# Patient Record
Sex: FEMALE | Race: WHITE | Employment: FULL TIME | URBAN - METROPOLITAN AREA
[De-identification: names, ages, dates, MRNs, and addresses within clinical notes are randomized per-mention and may not be internally consistent; named-entity substitution may affect disease eponyms.]

---

## 2021-10-15 ENCOUNTER — APPOINTMENT (OUTPATIENT)
Dept: CT IMAGING | Age: 61
End: 2021-10-15
Payer: COMMERCIAL

## 2021-10-15 ENCOUNTER — HOSPITAL ENCOUNTER (EMERGENCY)
Age: 61
Discharge: HOME | End: 2021-10-15
Attending: STUDENT IN AN ORGANIZED HEALTH CARE EDUCATION/TRAINING PROGRAM
Payer: COMMERCIAL

## 2021-10-15 ENCOUNTER — APPOINTMENT (OUTPATIENT)
Dept: GENERAL RADIOLOGY | Age: 61
End: 2021-10-15
Payer: COMMERCIAL

## 2021-10-15 ENCOUNTER — NURSE TRIAGE (OUTPATIENT)
Dept: OTHER | Facility: CLINIC | Age: 61
End: 2021-10-15

## 2021-10-15 VITALS
OXYGEN SATURATION: 94 % | SYSTOLIC BLOOD PRESSURE: 130 MMHG | HEIGHT: 70 IN | BODY MASS INDEX: 32.93 KG/M2 | RESPIRATION RATE: 14 BRPM | HEART RATE: 70 BPM | WEIGHT: 230 LBS | TEMPERATURE: 96.9 F | DIASTOLIC BLOOD PRESSURE: 114 MMHG

## 2021-10-15 DIAGNOSIS — R06.02 SHORTNESS OF BREATH: ICD-10-CM

## 2021-10-15 DIAGNOSIS — R07.9 CHEST PAIN, UNSPECIFIED TYPE: Primary | ICD-10-CM

## 2021-10-15 LAB
ABSOLUTE EOS #: 0.1 K/UL (ref 0–0.4)
ABSOLUTE IMMATURE GRANULOCYTE: ABNORMAL K/UL (ref 0–0.3)
ABSOLUTE LYMPH #: 1.7 K/UL (ref 1–4.8)
ABSOLUTE MONO #: 0.4 K/UL (ref 0.1–1.3)
ALBUMIN SERPL-MCNC: 3.9 G/DL (ref 3.5–5.2)
ALBUMIN/GLOBULIN RATIO: ABNORMAL (ref 1–2.5)
ALP BLD-CCNC: 120 U/L (ref 35–104)
ALT SERPL-CCNC: 23 U/L (ref 5–33)
ANION GAP SERPL CALCULATED.3IONS-SCNC: 9 MMOL/L (ref 9–17)
AST SERPL-CCNC: 21 U/L
BASOPHILS # BLD: 1 % (ref 0–2)
BASOPHILS ABSOLUTE: 0.1 K/UL (ref 0–0.2)
BILIRUB SERPL-MCNC: 0.24 MG/DL (ref 0.3–1.2)
BNP INTERPRETATION: NORMAL
BUN BLDV-MCNC: 20 MG/DL (ref 8–23)
BUN/CREAT BLD: ABNORMAL (ref 9–20)
CALCIUM SERPL-MCNC: 9.2 MG/DL (ref 8.6–10.4)
CHLORIDE BLD-SCNC: 106 MMOL/L (ref 98–107)
CO2: 24 MMOL/L (ref 20–31)
CREAT SERPL-MCNC: 1.03 MG/DL (ref 0.5–0.9)
D-DIMER QUANTITATIVE: 0.82 MG/L FEU (ref 0–0.59)
DIFFERENTIAL TYPE: ABNORMAL
EOSINOPHILS RELATIVE PERCENT: 2 % (ref 0–4)
GFR AFRICAN AMERICAN: >60 ML/MIN
GFR NON-AFRICAN AMERICAN: 54 ML/MIN
GFR SERPL CREATININE-BSD FRML MDRD: ABNORMAL ML/MIN/{1.73_M2}
GFR SERPL CREATININE-BSD FRML MDRD: ABNORMAL ML/MIN/{1.73_M2}
GLUCOSE BLD-MCNC: 105 MG/DL (ref 70–99)
HCT VFR BLD CALC: 41.8 % (ref 36–46)
HEMOGLOBIN: 14 G/DL (ref 12–16)
IMMATURE GRANULOCYTES: ABNORMAL %
LYMPHOCYTES # BLD: 22 % (ref 24–44)
MCH RBC QN AUTO: 31.8 PG (ref 26–34)
MCHC RBC AUTO-ENTMCNC: 33.6 G/DL (ref 31–37)
MCV RBC AUTO: 94.6 FL (ref 80–100)
MONOCYTES # BLD: 6 % (ref 1–7)
NRBC AUTOMATED: ABNORMAL PER 100 WBC
PDW BLD-RTO: 12.9 % (ref 11.5–14.9)
PLATELET # BLD: 265 K/UL (ref 150–450)
PLATELET ESTIMATE: ABNORMAL
PMV BLD AUTO: 7.2 FL (ref 6–12)
POTASSIUM SERPL-SCNC: 4.3 MMOL/L (ref 3.7–5.3)
PRO-BNP: 163 PG/ML
RBC # BLD: 4.42 M/UL (ref 4–5.2)
RBC # BLD: ABNORMAL 10*6/UL
SARS-COV-2, RAPID: NOT DETECTED
SEG NEUTROPHILS: 69 % (ref 36–66)
SEGMENTED NEUTROPHILS ABSOLUTE COUNT: 5.4 K/UL (ref 1.3–9.1)
SODIUM BLD-SCNC: 139 MMOL/L (ref 135–144)
SPECIMEN DESCRIPTION: NORMAL
TOTAL PROTEIN: 6.6 G/DL (ref 6.4–8.3)
TROPONIN INTERP: NORMAL
TROPONIN INTERP: NORMAL
TROPONIN T: NORMAL NG/ML
TROPONIN T: NORMAL NG/ML
TROPONIN, HIGH SENSITIVITY: <6 NG/L (ref 0–14)
TROPONIN, HIGH SENSITIVITY: <6 NG/L (ref 0–14)
WBC # BLD: 7.7 K/UL (ref 3.5–11)
WBC # BLD: ABNORMAL 10*3/UL

## 2021-10-15 PROCEDURE — 87635 SARS-COV-2 COVID-19 AMP PRB: CPT

## 2021-10-15 PROCEDURE — 83880 ASSAY OF NATRIURETIC PEPTIDE: CPT

## 2021-10-15 PROCEDURE — 71045 X-RAY EXAM CHEST 1 VIEW: CPT

## 2021-10-15 PROCEDURE — 6360000004 HC RX CONTRAST MEDICATION: Performed by: STUDENT IN AN ORGANIZED HEALTH CARE EDUCATION/TRAINING PROGRAM

## 2021-10-15 PROCEDURE — 93005 ELECTROCARDIOGRAM TRACING: CPT | Performed by: STUDENT IN AN ORGANIZED HEALTH CARE EDUCATION/TRAINING PROGRAM

## 2021-10-15 PROCEDURE — 85379 FIBRIN DEGRADATION QUANT: CPT

## 2021-10-15 PROCEDURE — 84484 ASSAY OF TROPONIN QUANT: CPT

## 2021-10-15 PROCEDURE — 71260 CT THORAX DX C+: CPT

## 2021-10-15 PROCEDURE — 85025 COMPLETE CBC W/AUTO DIFF WBC: CPT

## 2021-10-15 PROCEDURE — 2580000003 HC RX 258: Performed by: STUDENT IN AN ORGANIZED HEALTH CARE EDUCATION/TRAINING PROGRAM

## 2021-10-15 PROCEDURE — 99284 EMERGENCY DEPT VISIT MOD MDM: CPT

## 2021-10-15 PROCEDURE — 80053 COMPREHEN METABOLIC PANEL: CPT

## 2021-10-15 PROCEDURE — 36415 COLL VENOUS BLD VENIPUNCTURE: CPT

## 2021-10-15 RX ORDER — 0.9 % SODIUM CHLORIDE 0.9 %
80 INTRAVENOUS SOLUTION INTRAVENOUS ONCE
Status: COMPLETED | OUTPATIENT
Start: 2021-10-15 | End: 2021-10-15

## 2021-10-15 RX ORDER — SODIUM CHLORIDE 0.9 % (FLUSH) 0.9 %
10 SYRINGE (ML) INJECTION PRN
Status: DISCONTINUED | OUTPATIENT
Start: 2021-10-15 | End: 2021-10-15 | Stop reason: HOSPADM

## 2021-10-15 RX ADMIN — SODIUM CHLORIDE, PRESERVATIVE FREE 10 ML: 5 INJECTION INTRAVENOUS at 16:16

## 2021-10-15 RX ADMIN — IOPAMIDOL 75 ML: 755 INJECTION, SOLUTION INTRAVENOUS at 16:16

## 2021-10-15 RX ADMIN — SODIUM CHLORIDE 80 ML: 9 INJECTION, SOLUTION INTRAVENOUS at 16:16

## 2021-10-15 ASSESSMENT — ENCOUNTER SYMPTOMS
COLOR CHANGE: 0
ABDOMINAL PAIN: 0
VOMITING: 0
NAUSEA: 0
COUGH: 0
FACIAL SWELLING: 0
RHINORRHEA: 0
PHOTOPHOBIA: 0
EYE ITCHING: 0
DIARRHEA: 0
SHORTNESS OF BREATH: 1

## 2021-10-15 NOTE — ED TRIAGE NOTES
Mode of arrival (squad #, walk in, police, etc) : walk in        Chief complaint(s): chest pain, SOB        Arrival Note (brief scenario, treatment PTA, etc). : Pt states over the last few weeks she has become SOB with exertion. Pt reports normally walking 3-5 miles a day and feels she cannot complete that. Pt states her \"heart aches\" as well as left arm pain. Pt denies other health problems. Pt is A&Ox4, in no acute distress, respirations even and unlabored, ambulatory with steady gait. C= \"Have you ever felt that you should Cut down on your drinking? \"  No  A= \"Have people Annoyed you by criticizing your drinking? \"  No  G= \"Have you ever felt bad or Guilty about your drinking? \"  Refused  E= \"Have you ever had a drink as an Eye-opener first thing in the morning to steady your nerves or to help a hangover? \"  No      Deferred []      Reason for deferring: N/A    *If yes to two or more: probable alcohol abuse. *

## 2021-10-15 NOTE — ED NOTES
Resting in bed with call light in reach, denies needs at this time. Physician at bedside to discuss results.      Amanda Augustine, RAQUEL  10/15/21 1105

## 2021-10-15 NOTE — ED NOTES
Sitting up in bed with television on, call light in reach. Denies needs at this time. Updated on care plan.      Rossi Lau RN  10/15/21 3003

## 2021-10-15 NOTE — ED PROVIDER NOTES
EMERGENCY DEPARTMENT ENCOUNTER    Pt Name: Juana Cerrato  MRN: [id number]  [place] 1960  Date of evaluation: 10/15/21  CHIEF COMPLAINT       Chief Complaint   Patient presents with    Fatigue    Shortness of Breath     with exertion    Chest Pain     \"heart aches\"     HISTORY OF PRESENT ILLNESS   HPI  77-year-old female previously healthy presents for evaluation of exertional shortness of breath. Symptoms have been progressing over the past several weeks. Describes shortness of breath is worse with walking around. Describes associated left-sided aching type discomfort which comes and goes with exertion. Symptoms been worse over the past 2 to 3 days. The patient called primary care office to try to get in and was directed to the ER for evaluation. She denies nausea vomiting. She does have a associated dry cough. No fever chills. No abdominal pain. No significant lower extremity edema. No family history of any heart problems. Remote history of tobacco smoking. No history of DVT or PE.      REVIEW OF SYSTEMS     Review of Systems   Constitutional: Negative for chills and fatigue. HENT: Negative for facial swelling, postnasal drip and rhinorrhea. Eyes: Negative for photophobia and itching. Respiratory: Positive for shortness of breath. Negative for cough. Cardiovascular: Positive for chest pain. Negative for leg swelling. Gastrointestinal: Negative for abdominal pain, diarrhea, nausea and vomiting. Genitourinary: Negative for dysuria, flank pain and hematuria. Musculoskeletal: Negative for arthralgias and joint swelling. Skin: Negative for color change and rash. Neurological: Negative for dizziness, numbness and headaches. PASTMEDICAL HISTORY   History reviewed. No pertinent past medical history. Past Problem List  There is no problem list on file for this patient. SURGICAL HISTORY     History reviewed. No pertinent surgical history.   CURRENT MEDICATIONS       There are no discharge medications for this patient. ALLERGIES     has No Known Allergies. FAMILY HISTORY     has no family status information on file. SOCIAL HISTORY       Social History     Tobacco Use    Smoking status: Never Smoker    Smokeless tobacco: Never Used   Substance Use Topics    Alcohol use: Yes     Comment: ocasionaly    Drug use: Never     PHYSICAL EXAM     INITIAL VITALS: BP (!) 130/114   Pulse 70   Temp 96.9 °F (36.1 °C) (Temporal)   Resp 14   Ht 5' 10\" (1.778 m)   Wt 230 lb (104.3 kg)   SpO2 94%   BMI 33.00 kg/m²    Physical Exam  Vitals and nursing note reviewed. Constitutional:       Appearance: She is normal weight. HENT:      Head: Normocephalic and atraumatic. Eyes:      Extraocular Movements: Extraocular movements intact. Pupils: Pupils are equal, round, and reactive to light. Cardiovascular:      Rate and Rhythm: Normal rate and regular rhythm. Pulmonary:      Effort: Pulmonary effort is normal.      Breath sounds: Normal breath sounds. Abdominal:      General: Abdomen is flat. There is no distension. Palpations: There is no mass. Musculoskeletal:         General: No swelling. Normal range of motion. Cervical back: Normal range of motion and neck supple. Skin:     General: Skin is warm and dry. Neurological:      General: No focal deficit present. Mental Status: She is alert. Mental status is at baseline. MEDICAL DECISION MAKIN-year-old female presents for evaluation of progressing exertional shortness of breath and chest discomfort over the past several weeks. Her well-appearing with stable vital signs on initial physical exam.  Work-up for heart failure, symptomatic anemia, renal failure, pneumonia, pleural effusion, pulmonary embolism, ACS. Work-up unremarkable. Patient feeling better on reassessment. Will discharge home.          CRITICAL CARE:       PROCEDURES:    Procedures    DIAGNOSTIC RESULTS   EKG:All EKG's are interpreted by the Emergency Department Physician who either signs or Co-signs this chart in the absence of a cardiologist.    Sinus bradycardia rate of 57 left axis normal intervals no other concerning ST or T wave changes    RADIOLOGY:All plain film, CT, MRI, and formal ultrasound images (except ED bedside ultrasound) are read by the radiologist, see reports below, unless otherwisenoted in MDM or here. CT CHEST PULMONARY EMBOLISM W CONTRAST   Final Result   No evidence of pulmonary embolism or acute pulmonary abnormality. XR CHEST PORTABLE   Final Result   No acute process. LABS: All lab results were reviewed by myself, and all abnormals are listed below.   Labs Reviewed   CBC WITH AUTO DIFFERENTIAL - Abnormal; Notable for the following components:       Result Value    Seg Neutrophils 69 (*)     Lymphocytes 22 (*)     All other components within normal limits   COMPREHENSIVE METABOLIC PANEL W/ REFLEX TO MG FOR LOW K - Abnormal; Notable for the following components:    Glucose 105 (*)     CREATININE 1.03 (*)     Alkaline Phosphatase 120 (*)     Total Bilirubin 0.24 (*)     GFR Non- 54 (*)     All other components within normal limits   D-DIMER, QUANTITATIVE - Abnormal; Notable for the following components:    D-Dimer, Quant 0.82 (*)     All other components within normal limits   COVID-19, RAPID   TROPONIN   TROPONIN   BRAIN NATRIURETIC PEPTIDE   URINALYSIS       EMERGENCY DEPARTMENTCOURSE:         Vitals:    Vitals:    10/15/21 1353 10/15/21 1639   BP: (!) 165/93 (!) 130/114   Pulse: 65 70   Resp: 16 14   Temp: 96.9 °F (36.1 °C)    TempSrc: Temporal    SpO2: 99% 94%   Weight: 230 lb (104.3 kg)    Height: 5' 10\" (1.778 m)        The patient was given the following medications while in the emergency department:  Orders Placed This Encounter   Medications    0.9 % sodium chloride bolus    DISCONTD: sodium chloride flush 0.9 % injection 10 mL    iopamidol (ISOVUE-370) 76 % injection 75 mL     CONSULTS:  None    FINAL IMPRESSION      1. Chest pain, unspecified type    2. Shortness of breath          DISPOSITION/PLAN   DISPOSITION Decision To Discharge 10/15/2021 06:22:12 PM      PATIENT REFERRED TO:  [facility]  [address] [facility] [id number] [phone]  Call in 1 day      DISCHARGE MEDICATIONS:  There are no discharge medications for this patient. The care is provided during an unprecedented national emergency due to the novel coronavirus, COVID 19.   Shirley Novoa MD  Attending Emergency Physician                    Shirley Novoa MD  10/15/21 7751

## 2021-10-15 NOTE — TELEPHONE ENCOUNTER
Reason for Disposition   MODERATE difficulty breathing (e.g., speaks in phrases, SOB even at rest, pulse 100-120) of new onset or worse than normal    Answer Assessment - Initial Assessment Questions  1. RESPIRATORY STATUS: \"Describe your breathing? \" (e.g., wheezing, shortness of breath, unable to speak, severe coughing)       Walks with brother each evening for 3 miles; states over last 3 weeks has been more short of breath on the walks and more tired than usual. Last night on walk felt like needed to stop and take a break but kept going    2. ONSET: \"When did this breathing problem begin? \"       Started about 3 weeks and has been progressively worse    3. PATTERN \"Does the difficult breathing come and go, or has it been constant since it started? \"       Intermittent    4. SEVERITY: \"How bad is your breathing? \" (e.g., mild, moderate, severe)     - MILD: No SOB at rest, mild SOB with walking, speaks normally in sentences, can lay down, no retractions, pulse < 100.     - MODERATE: SOB at rest, SOB with minimal exertion and prefers to sit, cannot lie down flat, speaks in phrases, mild retractions, audible wheezing, pulse 100-120.     - SEVERE: Very SOB at rest, speaks in single words, struggling to breathe, sitting hunched forward, retractions, pulse > 120       Mild SOB    5. RECURRENT SYMPTOM: \"Have you had difficulty breathing before? \" If so, ask: \"When was the last time? \" and \"What happened that time? \"       Denies    6. CARDIAC HISTORY: \"Do you have any history of heart disease? \" (e.g., heart attack, angina, bypass surgery, angioplasty)       Was told a long time ago might have mitral valve prolapse    7. LUNG HISTORY: \"Do you have any history of lung disease? \"  (e.g., pulmonary embolus, asthma, emphysema)      Denies    8. CAUSE: \"What do you think is causing the breathing problem? \"       Unsure    9.  OTHER SYMPTOMS: \"Do you have any other symptoms? (e.g., dizziness, runny nose, cough, chest pain, fever) Slight cough; mild light headedness last night on walk; states feels chest a little bit different than usual; left arm discomfort last night    10. PREGNANCY: \"Is there any chance you are pregnant? \" \"When was your last menstrual period? \"        N/a    11. TRAVEL: \"Have you traveled out of the country in the last month? \" (e.g., travel history, exposures)        Denies    Protocols used: BREATHING DIFFICULTY-ADULT-OH    Received call from [facility] with [employer] Complaint. Brief description of triage: Patient experiencing worsening SOB with exertion x 3 weeks but was noticeably more SOB last night. Also states has been experiencing a funny, almost achy sensation in chest since yesterday and last night was experiencing left arm discomfort. Triage indicates for patient to go to ED now    Care advice provided, patient verbalizes understanding; denies any other questions or concerns; instructed to call back for any new or worsening symptoms. Attention Provider: Thank you for allowing me to participate in the care of your patient. The patient was connected to triage in response to information provided to the ECC/PSC. Please do not respond through this encounter as the response is not directed to a shared pool.

## 2021-10-16 LAB
EKG ATRIAL RATE: 57 BPM
EKG ATRIAL RATE: 60 BPM
EKG P AXIS: 48 DEGREES
EKG P AXIS: 51 DEGREES
EKG P-R INTERVAL: 126 MS
EKG P-R INTERVAL: 138 MS
EKG Q-T INTERVAL: 430 MS
EKG Q-T INTERVAL: 450 MS
EKG QRS DURATION: 78 MS
EKG QRS DURATION: 96 MS
EKG QTC CALCULATION (BAZETT): 430 MS
EKG QTC CALCULATION (BAZETT): 438 MS
EKG R AXIS: 0 DEGREES
EKG R AXIS: 10 DEGREES
EKG T AXIS: 26 DEGREES
EKG T AXIS: 50 DEGREES
EKG VENTRICULAR RATE: 57 BPM
EKG VENTRICULAR RATE: 60 BPM

## 2021-10-16 PROCEDURE — 93010 ELECTROCARDIOGRAM REPORT: CPT | Performed by: INTERNAL MEDICINE

## 2021-10-29 ENCOUNTER — OFFICE VISIT (OUTPATIENT)
Dept: PRIMARY CARE CLINIC | Age: 61
End: 2021-10-29
Payer: COMMERCIAL

## 2021-10-29 VITALS
OXYGEN SATURATION: 98 % | SYSTOLIC BLOOD PRESSURE: 132 MMHG | BODY MASS INDEX: 32.41 KG/M2 | DIASTOLIC BLOOD PRESSURE: 94 MMHG | HEART RATE: 64 BPM | HEIGHT: 70 IN | WEIGHT: 226.4 LBS

## 2021-10-29 DIAGNOSIS — R06.02 SOB (SHORTNESS OF BREATH): ICD-10-CM

## 2021-10-29 DIAGNOSIS — Z23 NEED FOR VACCINATION: ICD-10-CM

## 2021-10-29 DIAGNOSIS — Z12.31 VISIT FOR SCREENING MAMMOGRAM: ICD-10-CM

## 2021-10-29 DIAGNOSIS — R53.83 FATIGUE, UNSPECIFIED TYPE: ICD-10-CM

## 2021-10-29 DIAGNOSIS — R05.9 COUGH: ICD-10-CM

## 2021-10-29 DIAGNOSIS — Z00.00 HEALTH CARE MAINTENANCE: Primary | ICD-10-CM

## 2021-10-29 PROCEDURE — 99204 OFFICE O/P NEW MOD 45 MIN: CPT | Performed by: PHYSICIAN ASSISTANT

## 2021-10-29 PROCEDURE — 3017F COLORECTAL CA SCREEN DOC REV: CPT | Performed by: PHYSICIAN ASSISTANT

## 2021-10-29 PROCEDURE — G8482 FLU IMMUNIZE ORDER/ADMIN: HCPCS | Performed by: PHYSICIAN ASSISTANT

## 2021-10-29 PROCEDURE — 1036F TOBACCO NON-USER: CPT | Performed by: PHYSICIAN ASSISTANT

## 2021-10-29 PROCEDURE — 90471 IMMUNIZATION ADMIN: CPT | Performed by: PHYSICIAN ASSISTANT

## 2021-10-29 PROCEDURE — G8417 CALC BMI ABV UP PARAM F/U: HCPCS | Performed by: PHYSICIAN ASSISTANT

## 2021-10-29 PROCEDURE — 90686 IIV4 VACC NO PRSV 0.5 ML IM: CPT | Performed by: PHYSICIAN ASSISTANT

## 2021-10-29 PROCEDURE — G8427 DOCREV CUR MEDS BY ELIG CLIN: HCPCS | Performed by: PHYSICIAN ASSISTANT

## 2021-10-29 RX ORDER — MELOXICAM 15 MG/1
15 TABLET ORAL DAILY
COMMUNITY
End: 2021-10-29 | Stop reason: SDUPTHER

## 2021-10-29 RX ORDER — MELOXICAM 15 MG/1
15 TABLET ORAL DAILY
Qty: 30 TABLET | Refills: 2 | Status: SHIPPED | OUTPATIENT
Start: 2021-10-29 | End: 2022-02-07 | Stop reason: ALTCHOICE

## 2021-10-29 SDOH — ECONOMIC STABILITY: FOOD INSECURITY: WITHIN THE PAST 12 MONTHS, YOU WORRIED THAT YOUR FOOD WOULD RUN OUT BEFORE YOU GOT MONEY TO BUY MORE.: NEVER TRUE

## 2021-10-29 SDOH — ECONOMIC STABILITY: FOOD INSECURITY: WITHIN THE PAST 12 MONTHS, THE FOOD YOU BOUGHT JUST DIDN'T LAST AND YOU DIDN'T HAVE MONEY TO GET MORE.: NEVER TRUE

## 2021-10-29 ASSESSMENT — ENCOUNTER SYMPTOMS
VOMITING: 0
EYE PAIN: 0
DIARRHEA: 0
CONSTIPATION: 0
TROUBLE SWALLOWING: 0
CHEST TIGHTNESS: 0
ABDOMINAL PAIN: 0
COUGH: 1
VOICE CHANGE: 0
BLOOD IN STOOL: 0
EYE ITCHING: 0
SHORTNESS OF BREATH: 1
BACK PAIN: 0
NAUSEA: 0
WHEEZING: 0

## 2021-10-29 ASSESSMENT — PATIENT HEALTH QUESTIONNAIRE - PHQ9
SUM OF ALL RESPONSES TO PHQ QUESTIONS 1-9: 0
1. LITTLE INTEREST OR PLEASURE IN DOING THINGS: 0
SUM OF ALL RESPONSES TO PHQ QUESTIONS 1-9: 0
2. FEELING DOWN, DEPRESSED OR HOPELESS: 0
SUM OF ALL RESPONSES TO PHQ QUESTIONS 1-9: 0
SUM OF ALL RESPONSES TO PHQ9 QUESTIONS 1 & 2: 0

## 2021-10-29 ASSESSMENT — SOCIAL DETERMINANTS OF HEALTH (SDOH): HOW HARD IS IT FOR YOU TO PAY FOR THE VERY BASICS LIKE FOOD, HOUSING, MEDICAL CARE, AND HEATING?: NOT HARD AT ALL

## 2021-10-29 NOTE — PROGRESS NOTES
[address] [facility] PRIMARY CARE  [address]  [facility] [address]  Dept: 196 Juliette Delacruz is a 64 y.o. female who presents today as an new patient for her medical conditionsas noted below. Chief Complaint   Patient presents with    New Patient     pt is here today to establish care. Pt states she was seen in ED- [facility] 10/15 for SOB, dry cough and HBP. Pt states she does not see OBGYN. pt states she is still SOB and cough       HPI:     HPI  Here to establish    Is feeling progressively exhausted and coughing and easily SOB. NO fever, chills, loss of weight, N/V/D. Was given Mobic for heel spur and now she is also having body aches. Has had achiness in left chest and left arm with sweats. Does have SOB with exertion. HX of hematuria, worked up by urology years ago and she will bring letter for med record. No results found for: LDLCHOLESTEROL, LDLCALC    (goal LDL is <100)   AST (U/L)   Date Value   10/15/2021 21     ALT (U/L)   Date Value   10/15/2021 23     BUN (mg/dL)   Date Value   10/15/2021 20     BP Readings from Last 3 Encounters:   10/29/21 (!) 132/94   10/15/21 (!) 130/114          (goal 120/80)    History reviewed. No pertinent past medical history.    Past Surgical History:   Procedure Laterality Date    CATARACT REMOVAL      OVARY REMOVAL      parital for cyst    TONSILLECTOMY         Family History   Problem Relation Age of Onset    Other Mother         preforation of bowel    Diabetes Mother     Cancer Father         kidney       Social History     Tobacco Use    Smoking status: Former Smoker     Packs/day: 1.00     Years: 30.00     Pack years: 30.00     Types: Cigarettes     Quit date: 10/29/2014     Years since quittin.0    Smokeless tobacco: Never Used   Substance Use Topics    Alcohol use: Yes     Comment: socially      Current Outpatient Medications   Medication Sig Dispense Refill    meloxicam (MOBIC) 15 MG tablet Take 1 tablet by mouth daily 30 tablet 2     No current facility-administered medications for this visit. No Known Allergies    Health Maintenance   Topic Date Due    Hepatitis C screen  Never done    COVID-19 Vaccine (1) Never done    HIV screen  Never done    Cervical cancer screen  Never done    Lipid screen  Never done    Diabetes screen  Never done    Colon cancer screen colonoscopy  Never done    Breast cancer screen  Never done    Shingles Vaccine (1 of 2) Never done    Low dose CT lung screening  Never done    DTaP/Tdap/Td vaccine (2 - Td or Tdap) 12/15/2025    Flu vaccine  Completed    Hepatitis A vaccine  Aged Out    Hepatitis B vaccine  Aged Out    Hib vaccine  Aged Out    Meningococcal (ACWY) vaccine  Aged Out    Pneumococcal 0-64 years Vaccine  Aged Out       Subjective:     Review of Systems   Constitutional: Negative for activity change, appetite change, chills, fatigue, fever and unexpected weight change. HENT: Negative for dental problem, hearing loss, trouble swallowing and voice change. Eyes: Negative for pain, itching and visual disturbance. Respiratory: Positive for cough (productive, mostly dry) and shortness of breath. Negative for chest tightness and wheezing. Cardiovascular: Negative for chest pain, palpitations and leg swelling. Gastrointestinal: Negative for abdominal pain, blood in stool, constipation, diarrhea, nausea and vomiting. Endocrine: Negative for cold intolerance, heat intolerance, polydipsia, polyphagia and polyuria. Genitourinary: Positive for hematuria. Negative for difficulty urinating, dysuria, flank pain, frequency, menstrual problem, pelvic pain, urgency, vaginal bleeding, vaginal discharge and vaginal pain. Musculoskeletal: Positive for arthralgias. Negative for back pain and myalgias. Skin: Negative for rash. Neurological: Negative for dizziness, syncope, speech difficulty, light-headedness and headaches. Judgment: Judgment normal.         Assessment:       Diagnosis Orders   1. Health care maintenance  Lipid Panel   2. Visit for screening mammogram  MORAIMA DIGITAL SCREEN W OR WO CAD BILATERAL   3. Need for vaccination  INFLUENZA, QUADV, 3 YRS AND OLDER, IM PF, PREFILL SYR OR SDV, 0.5ML (AFLURIA QUADV, PF)   4. Fatigue, unspecified type  CBC Auto Differential    TSH without Reflex    T4, Free    Vitamin B12    Vitamin D 25 Hydroxy    Ferritin    Glucose    ECHO Complete 2D W Doppler W Color   5. Cough     6. SOB (shortness of breath)  CBC Auto Differential    NM MYOCARDIAL SPECT REST EXERCISE OR RX    ECHO Complete 2D W Doppler W Color        Plan:    Bring Covid card to next visit  BW ordered   [facility]   Ordered Stress test and Echo  Recheck BP  Flu vax today  Mammogram ordered  Will do female care here  Reviewed ER notes briefly    Refill sent     Return in about 1 month (around 11/29/2021) for Lab Results, Imaging results.     Orders Placed This Encounter   Procedures    MORAIMA DIGITAL SCREEN W OR WO CAD BILATERAL     Standing Status:   Future     Standing Expiration Date:   12/29/2022    NM MYOCARDIAL SPECT REST EXERCISE OR RX     Standing Status:   Future     Standing Expiration Date:   10/29/2022     Order Specific Question:   Reason for Exam?     Answer:   Shortness of breath     Order Specific Question:   Procedure Type     Answer:   Exercise     Order Specific Question:   Reason for exam:     Answer:   SOB with exertion    INFLUENZA, QUADV, 3 YRS AND OLDER, IM PF, PREFILL SYR OR SDV, 0.5ML (AFLURIA QUADV, PF)    Lipid Panel     Standing Status:   Future     Standing Expiration Date:   10/29/2022     Order Specific Question:   Is Patient Fasting?/# of Hours     Answer:   10-12    CBC Auto Differential     Standing Status:   Future     Standing Expiration Date:   10/29/2022    TSH without Reflex     Standing Status:   Future     Standing Expiration Date:   10/29/2022    T4, Free Standing Status:   Future     Standing Expiration Date:   10/29/2022    Vitamin B12     Standing Status:   Future     Standing Expiration Date:   10/29/2022    Vitamin D 25 Hydroxy     Standing Status:   Future     Standing Expiration Date:   10/29/2022    Ferritin     Standing Status:   Future     Standing Expiration Date:   10/29/2022    Glucose     Standing Status:   Future     Standing Expiration Date:   10/29/2022    ECHO Complete 2D W Doppler W Color     Standing Status:   Future     Standing Expiration Date:   10/29/2022     Order Specific Question:   Reason for exam:     Answer:   sweating     Orders Placed This Encounter   Medications    meloxicam (MOBIC) 15 MG tablet     Sig: Take 1 tablet by mouth daily     Dispense:  30 tablet     Refill:  2       Patient given educationalmaterials - see patient instructions. Discussed use, benefit, and side effectsof prescribed medications. All patient questions answered. Pt voiced understanding. Reviewed health maintenance. Instructed to continue current medications, diet andexercise. Patient agreed with treatment plan. Follow up as directed.      Electronicallysigned by Olivier Conrad PA-C on 10/29/2021 at 1:01 PM

## 2021-11-02 ENCOUNTER — HOSPITAL ENCOUNTER (OUTPATIENT)
Age: 61
Discharge: HOME | End: 2021-11-02
Payer: COMMERCIAL

## 2021-11-02 DIAGNOSIS — Z00.00 HEALTH CARE MAINTENANCE: ICD-10-CM

## 2021-11-02 DIAGNOSIS — R53.83 FATIGUE, UNSPECIFIED TYPE: ICD-10-CM

## 2021-11-02 DIAGNOSIS — R06.02 SOB (SHORTNESS OF BREATH): ICD-10-CM

## 2021-11-02 LAB
ABSOLUTE EOS #: 0.2 K/UL (ref 0–0.4)
ABSOLUTE IMMATURE GRANULOCYTE: NORMAL K/UL (ref 0–0.3)
ABSOLUTE LYMPH #: 1.6 K/UL (ref 1–4.8)
ABSOLUTE MONO #: 0.3 K/UL (ref 0.1–1.3)
BASOPHILS # BLD: 1 % (ref 0–2)
BASOPHILS ABSOLUTE: 0.1 K/UL (ref 0–0.2)
CHOLESTEROL/HDL RATIO: 3
CHOLESTEROL: 202 MG/DL
DIFFERENTIAL TYPE: NORMAL
EOSINOPHILS RELATIVE PERCENT: 3 % (ref 0–4)
FERRITIN: 188 UG/L (ref 13–150)
HCT VFR BLD CALC: 40.9 % (ref 36–46)
HDLC SERPL-MCNC: 68 MG/DL
HEMOGLOBIN: 14 G/DL (ref 12–16)
IMMATURE GRANULOCYTES: NORMAL %
LDL CHOLESTEROL: 120 MG/DL (ref 0–130)
LYMPHOCYTES # BLD: 26 % (ref 24–44)
MCH RBC QN AUTO: 32.3 PG (ref 26–34)
MCHC RBC AUTO-ENTMCNC: 34.2 G/DL (ref 31–37)
MCV RBC AUTO: 94.5 FL (ref 80–100)
MONOCYTES # BLD: 5 % (ref 1–7)
NRBC AUTOMATED: NORMAL PER 100 WBC
PDW BLD-RTO: 12.7 % (ref 11.5–14.9)
PLATELET # BLD: 247 K/UL (ref 150–450)
PLATELET ESTIMATE: NORMAL
PMV BLD AUTO: 7.7 FL (ref 6–12)
RBC # BLD: 4.33 M/UL (ref 4–5.2)
RBC # BLD: NORMAL 10*6/UL
SEG NEUTROPHILS: 65 % (ref 36–66)
SEGMENTED NEUTROPHILS ABSOLUTE COUNT: 4.2 K/UL (ref 1.3–9.1)
THYROXINE, FREE: 1.07 NG/DL (ref 0.93–1.7)
TRIGL SERPL-MCNC: 69 MG/DL
TSH SERPL DL<=0.05 MIU/L-ACNC: 1.73 MIU/L (ref 0.3–5)
VITAMIN B-12: 528 PG/ML (ref 232–1245)
VITAMIN D 25-HYDROXY: 25.2 NG/ML (ref 30–100)
VLDLC SERPL CALC-MCNC: ABNORMAL MG/DL (ref 1–30)
WBC # BLD: 6.4 K/UL (ref 3.5–11)
WBC # BLD: NORMAL 10*3/UL

## 2021-11-02 PROCEDURE — 80061 LIPID PANEL: CPT

## 2021-11-02 PROCEDURE — 82607 VITAMIN B-12: CPT

## 2021-11-02 PROCEDURE — 36415 COLL VENOUS BLD VENIPUNCTURE: CPT

## 2021-11-02 PROCEDURE — 84443 ASSAY THYROID STIM HORMONE: CPT

## 2021-11-02 PROCEDURE — 82306 VITAMIN D 25 HYDROXY: CPT

## 2021-11-02 PROCEDURE — 84439 ASSAY OF FREE THYROXINE: CPT

## 2021-11-02 PROCEDURE — 85025 COMPLETE CBC W/AUTO DIFF WBC: CPT

## 2021-11-02 PROCEDURE — 82728 ASSAY OF FERRITIN: CPT

## 2021-11-03 ENCOUNTER — HOSPITAL ENCOUNTER (OUTPATIENT)
Dept: NON INVASIVE DIAGNOSTICS | Age: 61
Discharge: HOME | End: 2021-11-03
Payer: COMMERCIAL

## 2021-11-03 ENCOUNTER — HOSPITAL ENCOUNTER (OUTPATIENT)
Dept: NUCLEAR MEDICINE | Age: 61
Discharge: HOME | End: 2021-11-05
Payer: COMMERCIAL

## 2021-11-03 VITALS — BODY MASS INDEX: 32.93 KG/M2 | WEIGHT: 230 LBS | HEIGHT: 70 IN

## 2021-11-03 DIAGNOSIS — R06.02 SOB (SHORTNESS OF BREATH): ICD-10-CM

## 2021-11-03 DIAGNOSIS — R53.83 FATIGUE, UNSPECIFIED TYPE: ICD-10-CM

## 2021-11-03 LAB
LV EF: 50 %
LV EF: 56 %
LVEF MODALITY: NORMAL
LVEF MODALITY: NORMAL

## 2021-11-03 PROCEDURE — 93017 CV STRESS TEST TRACING ONLY: CPT

## 2021-11-03 PROCEDURE — 2580000003 HC RX 258: Performed by: PHYSICIAN ASSISTANT

## 2021-11-03 PROCEDURE — 78452 HT MUSCLE IMAGE SPECT MULT: CPT

## 2021-11-03 PROCEDURE — 3430000000 HC RX DIAGNOSTIC RADIOPHARMACEUTICAL: Performed by: PHYSICIAN ASSISTANT

## 2021-11-03 PROCEDURE — 93306 TTE W/DOPPLER COMPLETE: CPT

## 2021-11-03 PROCEDURE — A9500 TC99M SESTAMIBI: HCPCS | Performed by: PHYSICIAN ASSISTANT

## 2021-11-03 RX ORDER — SODIUM CHLORIDE 9 MG/ML
500 INJECTION, SOLUTION INTRAVENOUS CONTINUOUS PRN
Status: ACTIVE | OUTPATIENT
Start: 2021-11-03 | End: 2021-11-03

## 2021-11-03 RX ORDER — ALBUTEROL SULFATE 90 UG/1
2 AEROSOL, METERED RESPIRATORY (INHALATION) PRN
Status: ACTIVE | OUTPATIENT
Start: 2021-11-03 | End: 2021-11-03

## 2021-11-03 RX ORDER — METOPROLOL TARTRATE 5 MG/5ML
5 INJECTION INTRAVENOUS EVERY 5 MIN PRN
Status: ACTIVE | OUTPATIENT
Start: 2021-11-03 | End: 2021-11-03

## 2021-11-03 RX ORDER — NITROGLYCERIN 0.4 MG/1
0.4 TABLET SUBLINGUAL EVERY 5 MIN PRN
Status: ACTIVE | OUTPATIENT
Start: 2021-11-03 | End: 2021-11-03

## 2021-11-03 RX ORDER — SODIUM CHLORIDE 0.9 % (FLUSH) 0.9 %
5-40 SYRINGE (ML) INJECTION PRN
Status: ACTIVE | OUTPATIENT
Start: 2021-11-03 | End: 2021-11-03

## 2021-11-03 RX ORDER — ATROPINE SULFATE 0.1 MG/ML
0.5 INJECTION INTRAVENOUS EVERY 5 MIN PRN
Status: ACTIVE | OUTPATIENT
Start: 2021-11-03 | End: 2021-11-03

## 2021-11-03 RX ORDER — SODIUM CHLORIDE 0.9 % (FLUSH) 0.9 %
10 SYRINGE (ML) INJECTION PRN
Status: DISCONTINUED | OUTPATIENT
Start: 2021-11-03 | End: 2021-11-06 | Stop reason: HOSPADM

## 2021-11-03 RX ADMIN — TETRAKIS(2-METHOXYISOBUTYLISOCYANIDE)COPPER(I) TETRAFLUOROBORATE 35.7 MILLICURIE: 1 INJECTION, POWDER, LYOPHILIZED, FOR SOLUTION INTRAVENOUS at 09:17

## 2021-11-03 RX ADMIN — SODIUM CHLORIDE, PRESERVATIVE FREE 10 ML: 5 INJECTION INTRAVENOUS at 08:50

## 2021-11-03 RX ADMIN — TETRAKIS(2-METHOXYISOBUTYLISOCYANIDE)COPPER(I) TETRAFLUOROBORATE 11.2 MILLICURIE: 1 INJECTION, POWDER, LYOPHILIZED, FOR SOLUTION INTRAVENOUS at 07:28

## 2021-11-03 RX ADMIN — Medication 10 ML: at 07:28

## 2021-11-03 NOTE — PROGRESS NOTES
CST Exercise. Stress Tech performs patient preparation of physical comfort, review test procedures, pre-stress EKG. Lung Sounds clear t/o. Consent verified by pt. .  Educated patient on test procedure. Cardiologist reviewed pre-test EKG and is present for test.  Patient tolerated test well. Start /88 HR 59  Stop /98 HR 69  EKG portion of testing is completed with borderline EKG changes, nuc. med. portion is still pending.

## 2021-11-03 NOTE — PROCEDURES
[address] [facility]                    [address] [facility]. [address]                              CARDIAC STRESS TEST    PATIENT NAME: Coleman Mills                   :        1960  MED REC NO:   [id number]                              ROOM:  ACCOUNT NO:   [de-identified]                           ADMIT DATE: 2021  PROVIDER:     Lamin Rodríguez DO    DATE OF STUDY:  2021     TEST TYPE: CARDIOLYTE TREADMILL STRESS TEST  INDICATION: SHORTNESS OF BREATH ON EXERTION  REFERRING PHYSICIAN: NANCY RAMSEY    100% MAX PREDICTED HEART RATE: 159 BEATS PER MINUTE  85% MAX PREDICTED HEART RATE: 135 BEATS PER MINUTE  RESTING HEART RATE: 59 BEATS PER MINUTE  MAXIMUM HEART RATE ACHIEVED: 148 BEATS PER MINUTE  PERCENTAGE OF AGE PREDICTED MAXIMUM ACHIEVED: 93%  RESTING BLOOD PRESSURE: 163/88  PEAK BLOOD PRESSURE: 211/93  PEAK DOUBLE PRODUCT: 31,228  METS: 10.40    MEDICATION(S) GIVEN: NONE  REASON FOR TERMINATION: 85% MAXIMUM PREDICTED HEART RATE ACHIEVED  TOTAL TIME ON TREADMILL: 9:00 MINUTES    RESTING EKG: ABNORMAL  COMMENTS: SINUS BRADYCARDIA, NON-SPECIFIC T-WAVE ABNORMALITY  BLOOD PRESSURE RESPONSE: APPROPRIATE  STRESS EKGs: NORMAL  COMMENTS: PREMATURE VENTRICULAR CONTRACTION  ISCHEMIC EKG CHANGES: BORDERLINE ISCHEMIC EKG CHANGES    EKG IMPRESSION: ELECTROCARDIOGRAPHICALLY BORDERLINE CHANGE TREADMILL  STRESS TEST. RADIOISOTOPE RESULTS TO FOLLOW FROM DEPARTMENT OF NUCLEAR  MEDICINE.     COMMENTS: FEW PREMATURE VENTRICULAR CONTRACTION, EXCELLENT FUNCTIONAL  CAPACITY, BORDERLINE 1 mm UPSLOPING ST DEPRESSION      JAROD CARBAJAL DO    D: 2021 7:42:42       T: 2021 9:55:09     /[id number]  Job#: [id number]     Doc#: Unknown    CC:    (Retain this field even if not dictated or not decipherable)

## 2021-11-05 ENCOUNTER — TELEPHONE (OUTPATIENT)
Dept: PRIMARY CARE CLINIC | Age: 61
End: 2021-11-05

## 2021-11-05 DIAGNOSIS — R53.83 FATIGUE, UNSPECIFIED TYPE: ICD-10-CM

## 2021-11-05 DIAGNOSIS — R06.02 SOB (SHORTNESS OF BREATH): ICD-10-CM

## 2021-11-05 DIAGNOSIS — R94.39 ABNORMAL STRESS TEST: Primary | ICD-10-CM

## 2021-11-15 ENCOUNTER — HOSPITAL ENCOUNTER (OUTPATIENT)
Dept: WOMENS IMAGING | Age: 61
Discharge: HOME | End: 2021-11-17
Payer: COMMERCIAL

## 2021-11-15 DIAGNOSIS — Z12.39 SCREENING BREAST EXAMINATION: ICD-10-CM

## 2021-11-15 PROCEDURE — 77063 BREAST TOMOSYNTHESIS BI: CPT

## 2021-11-22 ENCOUNTER — OFFICE VISIT (OUTPATIENT)
Dept: PRIMARY CARE CLINIC | Age: 61
End: 2021-11-22
Payer: COMMERCIAL

## 2021-11-22 VITALS
HEART RATE: 67 BPM | OXYGEN SATURATION: 98 % | WEIGHT: 229.2 LBS | BODY MASS INDEX: 32.89 KG/M2 | DIASTOLIC BLOOD PRESSURE: 84 MMHG | SYSTOLIC BLOOD PRESSURE: 136 MMHG

## 2021-11-22 DIAGNOSIS — R53.83 FATIGUE, UNSPECIFIED TYPE: Primary | ICD-10-CM

## 2021-11-22 DIAGNOSIS — E55.9 VITAMIN D DEFICIENCY: ICD-10-CM

## 2021-11-22 DIAGNOSIS — I10 ESSENTIAL HYPERTENSION: ICD-10-CM

## 2021-11-22 DIAGNOSIS — R94.39 ABNORMAL STRESS TEST: ICD-10-CM

## 2021-11-22 DIAGNOSIS — K63.5 POLYP OF COLON, UNSPECIFIED PART OF COLON, UNSPECIFIED TYPE: ICD-10-CM

## 2021-11-22 DIAGNOSIS — R06.02 SOB (SHORTNESS OF BREATH): ICD-10-CM

## 2021-11-22 PROCEDURE — G8427 DOCREV CUR MEDS BY ELIG CLIN: HCPCS | Performed by: PHYSICIAN ASSISTANT

## 2021-11-22 PROCEDURE — 1036F TOBACCO NON-USER: CPT | Performed by: PHYSICIAN ASSISTANT

## 2021-11-22 PROCEDURE — G8482 FLU IMMUNIZE ORDER/ADMIN: HCPCS | Performed by: PHYSICIAN ASSISTANT

## 2021-11-22 PROCEDURE — 99214 OFFICE O/P EST MOD 30 MIN: CPT | Performed by: PHYSICIAN ASSISTANT

## 2021-11-22 PROCEDURE — 3017F COLORECTAL CA SCREEN DOC REV: CPT | Performed by: PHYSICIAN ASSISTANT

## 2021-11-22 PROCEDURE — G8417 CALC BMI ABV UP PARAM F/U: HCPCS | Performed by: PHYSICIAN ASSISTANT

## 2021-11-22 RX ORDER — METOPROLOL SUCCINATE 25 MG/1
25 TABLET, EXTENDED RELEASE ORAL DAILY
Qty: 30 TABLET | Refills: 0 | Status: SHIPPED | OUTPATIENT
Start: 2021-11-22 | End: 2021-12-17 | Stop reason: SDUPTHER

## 2021-11-22 ASSESSMENT — ENCOUNTER SYMPTOMS
COUGH: 0
SORE THROAT: 0
VOMITING: 0
SHORTNESS OF BREATH: 0
EYE DISCHARGE: 0
WHEEZING: 0
ABDOMINAL PAIN: 0
RHINORRHEA: 0
NAUSEA: 0
EYE REDNESS: 0
DIARRHEA: 0

## 2021-11-22 NOTE — PROGRESS NOTES
Topic Date Due    Hepatitis C screen  Never done    COVID-19 Vaccine (1) Never done    HIV screen  Never done    Cervical cancer screen  Never done    Diabetes screen  Never done    Colon cancer screen colonoscopy  Never done    Shingles Vaccine (1 of 2) Never done    Low dose CT lung screening  Never done    Breast cancer screen  11/15/2023    Lipid screen  11/02/2026    DTaP/Tdap/Td vaccine (3 - Td or Tdap) 12/27/2026    Flu vaccine  Completed    Hepatitis A vaccine  Aged Out    Hepatitis B vaccine  Aged Out    Hib vaccine  Aged Out    Meningococcal (ACWY) vaccine  Aged Out    Pneumococcal 0-64 years Vaccine  Aged Out       Subjective:      Review of Systems   Constitutional: Negative for chills and fever. HENT: Negative for rhinorrhea and sore throat. Eyes: Negative for discharge and redness. Respiratory: Negative for cough, shortness of breath and wheezing. Cardiovascular: Negative for chest pain and palpitations. Gastrointestinal: Negative for abdominal pain, diarrhea, nausea and vomiting. Genitourinary: Negative for dysuria and frequency. Musculoskeletal: Negative for arthralgias and myalgias. Neurological: Negative for dizziness, light-headedness and headaches. Psychiatric/Behavioral: Negative for sleep disturbance. Objective:     /84   Pulse 67   Wt 229 lb 3.2 oz (104 kg)   SpO2 98%   BMI 32.89 kg/m²   Physical Exam  Vitals and nursing note reviewed. Constitutional:       General: She is not in acute distress. Appearance: She is well-developed. She is not ill-appearing. HENT:      Head: Normocephalic and atraumatic. Right Ear: External ear normal.      Left Ear: External ear normal.   Eyes:      General: No scleral icterus. Right eye: No discharge. Left eye: No discharge. Conjunctiva/sclera: Conjunctivae normal.      Pupils: Pupils are equal, round, and reactive to light. Neck:      Thyroid: No thyromegaly. Trachea: No tracheal deviation. Cardiovascular:      Rate and Rhythm: Normal rate and regular rhythm. Heart sounds: Normal heart sounds. Pulmonary:      Effort: Pulmonary effort is normal. No respiratory distress. Breath sounds: Normal breath sounds. No wheezing. Lymphadenopathy:      Cervical: No cervical adenopathy. Skin:     General: Skin is warm. Findings: No rash. Neurological:      Mental Status: She is alert and oriented to person, place, and time. Psychiatric:         Mood and Affect: Mood normal.         Behavior: Behavior normal.         Thought Content: Thought content normal.         Assessment:       Diagnosis Orders   1. Fatigue, unspecified type     2. Abnormal stress test     3. SOB (shortness of breath)     4. Vitamin D deficiency  Vitamin D 25 Hydroxy   5. Polyp of colon, unspecified part of colon, unspecified type  Alyssa Carbajal MD, Colorectal Surgery, [place]   6. Essential hypertension  metoprolol succinate (TOPROL XL) 25 MG extended release tablet        Plan:    Scan Covid card into [facility]   Start Vit D 3 5000Units daily  Recheck level in 3 months  Take baby ASA daily   Start metoprolol 25mg 1 po qd   Call and get glucose level result   REferral for colon cancer screening. Seeing Cardiology this Wednesday     Return in about 6 months (around 5/22/2022) for [facility].     Orders Placed This Encounter   Procedures    Vitamin D 25 Hydroxy     Standing Status:   Future     Standing Expiration Date:   11/22/2022    BERNIE Delgado MD, Colorectal Surgery, [place]     Referral Priority:   Routine     Referral Type:   Eval and Treat     Referral Reason:   Specialty Services Required     Referred to Provider:   Alexy Monaco MD     Requested Specialty:   Colon and Rectal Surgery     Number of Visits Requested:   1     Orders Placed This Encounter   Medications    metoprolol succinate (TOPROL XL) 25 MG extended release tablet     Sig: Take 1 tablet by mouth daily Dispense:  30 tablet     Refill:  0       Patient given educational materials - see patient instructions. Discussed use, benefit, and side effects of prescribed medications. All patient questions answered. Pt voiced understanding. Reviewed health maintenance. Instructed to continue current medications, diet and exercise. Patient agreed with treatment plan. Follow up as directed.      Electronically signed by Dandre Hernandez PA-C on 11/22/2021 at 8:21 AM

## 2021-12-08 ENCOUNTER — HOSPITAL ENCOUNTER (OUTPATIENT)
Dept: CARDIAC CATH/INVASIVE PROCEDURES | Age: 61
Discharge: HOME | End: 2021-12-08
Payer: COMMERCIAL

## 2021-12-08 VITALS
HEIGHT: 70 IN | RESPIRATION RATE: 16 BRPM | HEART RATE: 58 BPM | SYSTOLIC BLOOD PRESSURE: 135 MMHG | WEIGHT: 230 LBS | BODY MASS INDEX: 32.93 KG/M2 | DIASTOLIC BLOOD PRESSURE: 79 MMHG | TEMPERATURE: 98.5 F | OXYGEN SATURATION: 97 %

## 2021-12-08 LAB
GFR NON-AFRICAN AMERICAN: >60 ML/MIN
GFR SERPL CREATININE-BSD FRML MDRD: >60 ML/MIN
GFR SERPL CREATININE-BSD FRML MDRD: NORMAL ML/MIN/{1.73_M2}
GLUCOSE BLD-MCNC: 101 MG/DL (ref 74–100)
PLATELET # BLD: 245 K/UL (ref 138–453)
POC BUN: 20 MG/DL (ref 8–26)
POC CHLORIDE: 112 MMOL/L (ref 98–107)
POC CREATININE: 0.8 MG/DL (ref 0.51–1.19)
POC HEMATOCRIT: 40 % (ref 36–46)
POC HEMOGLOBIN: 13.7 G/DL (ref 12–16)
POC POTASSIUM: 4.2 MMOL/L (ref 3.5–4.5)
POC SODIUM: 144 MMOL/L (ref 138–146)

## 2021-12-08 PROCEDURE — 99152 MOD SED SAME PHYS/QHP 5/>YRS: CPT

## 2021-12-08 PROCEDURE — 2500000003 HC RX 250 WO HCPCS

## 2021-12-08 PROCEDURE — 6360000002 HC RX W HCPCS

## 2021-12-08 PROCEDURE — 6360000004 HC RX CONTRAST MEDICATION

## 2021-12-08 PROCEDURE — 7100000000 HC PACU RECOVERY - FIRST 15 MIN

## 2021-12-08 PROCEDURE — 82565 ASSAY OF CREATININE: CPT

## 2021-12-08 PROCEDURE — 85014 HEMATOCRIT: CPT

## 2021-12-08 PROCEDURE — 85049 AUTOMATED PLATELET COUNT: CPT

## 2021-12-08 PROCEDURE — 82947 ASSAY GLUCOSE BLOOD QUANT: CPT

## 2021-12-08 PROCEDURE — 84132 ASSAY OF SERUM POTASSIUM: CPT

## 2021-12-08 PROCEDURE — C1894 INTRO/SHEATH, NON-LASER: HCPCS

## 2021-12-08 PROCEDURE — 93454 CORONARY ARTERY ANGIO S&I: CPT | Performed by: INTERNAL MEDICINE

## 2021-12-08 PROCEDURE — 2709999900 HC NON-CHARGEABLE SUPPLY

## 2021-12-08 PROCEDURE — C1769 GUIDE WIRE: HCPCS

## 2021-12-08 PROCEDURE — 84520 ASSAY OF UREA NITROGEN: CPT

## 2021-12-08 PROCEDURE — 7100000001 HC PACU RECOVERY - ADDTL 15 MIN

## 2021-12-08 PROCEDURE — 84295 ASSAY OF SERUM SODIUM: CPT

## 2021-12-08 PROCEDURE — 2580000003 HC RX 258: Performed by: INTERNAL MEDICINE

## 2021-12-08 PROCEDURE — 82435 ASSAY OF BLOOD CHLORIDE: CPT

## 2021-12-08 RX ORDER — SODIUM CHLORIDE 0.9 % (FLUSH) 0.9 %
5-40 SYRINGE (ML) INJECTION PRN
Status: DISCONTINUED | OUTPATIENT
Start: 2021-12-08 | End: 2021-12-09 | Stop reason: HOSPADM

## 2021-12-08 RX ORDER — SODIUM CHLORIDE 9 MG/ML
25 INJECTION, SOLUTION INTRAVENOUS PRN
Status: DISCONTINUED | OUTPATIENT
Start: 2021-12-08 | End: 2021-12-09 | Stop reason: HOSPADM

## 2021-12-08 RX ORDER — SODIUM CHLORIDE 9 MG/ML
INJECTION, SOLUTION INTRAVENOUS CONTINUOUS
Status: DISCONTINUED | OUTPATIENT
Start: 2021-12-08 | End: 2021-12-09 | Stop reason: HOSPADM

## 2021-12-08 RX ORDER — SODIUM CHLORIDE 0.9 % (FLUSH) 0.9 %
5-40 SYRINGE (ML) INJECTION EVERY 12 HOURS SCHEDULED
Status: DISCONTINUED | OUTPATIENT
Start: 2021-12-08 | End: 2021-12-09 | Stop reason: HOSPADM

## 2021-12-08 RX ORDER — ACETAMINOPHEN 325 MG/1
650 TABLET ORAL EVERY 4 HOURS PRN
Status: DISCONTINUED | OUTPATIENT
Start: 2021-12-08 | End: 2021-12-09 | Stop reason: HOSPADM

## 2021-12-08 RX ORDER — ASPIRIN 81 MG/1
81 TABLET, CHEWABLE ORAL DAILY
COMMUNITY
End: 2022-08-08 | Stop reason: ALTCHOICE

## 2021-12-08 RX ADMIN — SODIUM CHLORIDE: 9 INJECTION, SOLUTION INTRAVENOUS at 08:29

## 2021-12-08 NOTE — PROGRESS NOTES
All discharge instructions reviewed, questions answered, paper signed and given copy. Patient discharged per ambulatory with  and belongings. Patient ambulated, ate food ,voided and lisbeth well prior to discharge. Rt arm board applied

## 2021-12-08 NOTE — OP NOTE
Currently on Dialysis. [] Prior CABG. [] Currently smoker. [] Cardiac Arrest outside of healthcare facility. [] Yes    [] No        Witnessed     [] Yes   [] No     Arrest after arrival of EMS  [] Yes   [] No     [] Cardiac Arrest at other Facility. [] Yes   [x] No    Pre-Procedure Information. Heart Failure       [] Yes    [x] No        Class  [] I      [] II  [] III    [] IV. New Diagnosis    [] Yes  [] No    HF Type      [] Systolic   [] Diastolic          [] Unknown. Diagnostic Test:   EKG       [] Normal   [x] Abnormal    New antiarrhythmia medications:    [] Yes   [] No   New onset atrial fibrillation / Flutter     [] Yes   [] No   ECG Abnormalities:      [] V. Fib   [] Sydney V. Tach           [] NS V. T   [] New LBBB           [] T. Inv  []  ST dev > 0.5 mm         [] PVC's freq  [] PVC's infrequent    Stress Test Performed:      [x] Yes    [] No     Type:     [] Stress Echo   [] Exercise Stress Test (no imaging)      [x] Stress Nuclear  [] Stress Imaging     Results   [] Negative   [] Positive        [x] Indeterminate  [] Unavailable     If Positive/ Risk / Extent of Ischemia:       [] Low  [] Intermediate         [] High  [] Unavailable      Cardiac CTA Performed:     [] Yes    [x] No      Results   [] CAD   [] Non obstructive CAD      [] No CAD   [] Uncertain      [] Unknown   [] Structural Disease. Pre Procedure Medications:   [x] Yes    [] No         [x] ASA   [] Beta Blockers      [] Nitrate   [] Ca Channel Blockers      [] Ranolazine   [] Statin       [] Plavix/Others antiplatelets      Electronically signed on 12/8/2021 at 11:52 AM by:    Socorro Campos MD  Fellow, [address] [place] [address] 60. Andra CAMPBELL.   [facility]

## 2021-12-08 NOTE — H&P
[facility]  Procedure History and Physical Update          Patient Name: Rosalinda Cheney  MRN:    [id number]  YOB: 1960  Date of evaluation:  12/8/2021    Procedure:    Cardiac cath +/- PCI    Indication for procedure:  Abnormal stress test      Please refer to the office note completed by Dr. Jackie Rhodes on 11/24/21 in the medical record and note that:    [x] I have examined the patient and reviewed the H&P/Consult and there are no changes to be made to the assessment or plan. [] I have examined the patient and reviewed the H&P/Consult and have noted the following changes:    Past Medical History:   Diagnosis Date    Fatigue     Hypertension        Past Surgical History:   Procedure Laterality Date    CATARACT REMOVAL      EYE SURGERY Bilateral     eye muscles as a child    OVARY REMOVAL  2000    parital for cyst    TONSILLECTOMY         Family History   Problem Relation Age of Onset    Other Mother         preforation of bowel    Diabetes Mother     Cancer Father         kidney       No Known Allergies    Prior to Admission medications    Medication Sig Start Date End Date Taking? Authorizing Provider   aspirin 81 MG chewable tablet Take 81 mg by mouth daily   Yes Historical Provider, MD   vitamin D (CHOLECALCIFEROL) 125 MCG (5000 UT) CAPS capsule Take 5,000 Units by mouth daily   Yes Historical Provider, MD   metoprolol succinate (TOPROL XL) 25 MG extended release tablet Take 1 tablet by mouth daily 11/22/21  Yes Deana Royal PA-C   meloxicam DONOVAN CASEY [facility]) 15 MG tablet Take 1 tablet by mouth daily 10/29/21  Yes Deana Royal PA-C         Vitals:    12/08/21 0823   BP:    Pulse: 58   Resp:    Temp:    SpO2:        Constitutional and General Appearance:   alert, cooperative, no distress and appears stated age  HEENT:  · PERRL, EOMI  Respiratory:  · Normal excursion and expansion without use of accessory muscles  · Resp Auscultation:  Good respiratory effort.  No for increased work of breathing. On auscultation: clear to auscultation bilaterally  Cardiovascular:  · Regular rate and rhythm. · S1/S2  · No murmurs. · The apical impulse is not displaced  Abdomen:  · Soft  · Bowel sounds present  · Non-tender to palpation  Extremities:  · No cyanosis or clubbing  · Lower extremity edema: No.  Skin:  · Warm and dry  Neurological:  · Alert and oriented. DATA:                Plan:  · Proceed with planned procedure. · Further orders to follow. Risks, benefits, and alternatives of cardiac catheterization were discussed, in detail, with patient. Risks include, but not limited to, bleeding, requiring blood transfusion, vascular complication requiring surgery, renal failure with need of dialysis, CVA, MI, death and anesthesia complications including intubation were discussed. Patient verbalized understanding and agreed to proceed with the procedure understanding the above risks and alternatives to the procedure. Risks, benefits, alternatives, and details discussed extensively. Accepts and consents.       Pre Procedure Conscious Sedation Data:  ASA Class:                  [] I [x] II [] III [] IV     Mallampati Class:       [] I [x] II [] III [] Elmira Jacinto MD  Fellow, [facility]

## 2022-02-07 ENCOUNTER — OFFICE VISIT (OUTPATIENT)
Dept: PRIMARY CARE CLINIC | Age: 62
End: 2022-02-07
Payer: COMMERCIAL

## 2022-02-07 VITALS
HEART RATE: 88 BPM | WEIGHT: 232.6 LBS | BODY MASS INDEX: 33.3 KG/M2 | DIASTOLIC BLOOD PRESSURE: 82 MMHG | OXYGEN SATURATION: 98 % | SYSTOLIC BLOOD PRESSURE: 132 MMHG | HEIGHT: 70 IN

## 2022-02-07 DIAGNOSIS — I10 HYPERTENSION, UNSPECIFIED TYPE: Primary | ICD-10-CM

## 2022-02-07 DIAGNOSIS — E55.9 VITAMIN D DEFICIENCY: ICD-10-CM

## 2022-02-07 DIAGNOSIS — R06.09 DYSPNEA ON EXERTION: ICD-10-CM

## 2022-02-07 LAB — VITAMIN D 25-HYDROXY: 41.5 NG/ML (ref 30–100)

## 2022-02-07 PROCEDURE — 99214 OFFICE O/P EST MOD 30 MIN: CPT | Performed by: PHYSICIAN ASSISTANT

## 2022-02-07 PROCEDURE — G8427 DOCREV CUR MEDS BY ELIG CLIN: HCPCS | Performed by: PHYSICIAN ASSISTANT

## 2022-02-07 PROCEDURE — 3017F COLORECTAL CA SCREEN DOC REV: CPT | Performed by: PHYSICIAN ASSISTANT

## 2022-02-07 PROCEDURE — G8417 CALC BMI ABV UP PARAM F/U: HCPCS | Performed by: PHYSICIAN ASSISTANT

## 2022-02-07 PROCEDURE — 1036F TOBACCO NON-USER: CPT | Performed by: PHYSICIAN ASSISTANT

## 2022-02-07 PROCEDURE — G8482 FLU IMMUNIZE ORDER/ADMIN: HCPCS | Performed by: PHYSICIAN ASSISTANT

## 2022-02-07 RX ORDER — MELOXICAM 15 MG/1
15 TABLET ORAL DAILY
Qty: 30 TABLET | Refills: 2 | Status: CANCELLED | OUTPATIENT
Start: 2022-02-07

## 2022-02-07 RX ORDER — CARVEDILOL 3.12 MG/1
6.25 TABLET ORAL 2 TIMES DAILY WITH MEALS
Qty: 60 TABLET | Refills: 3
Start: 2022-02-07

## 2022-02-07 RX ORDER — ACETAMINOPHEN 500 MG
1000 TABLET ORAL 3 TIMES DAILY PRN
Qty: 540 TABLET | Refills: 1 | Status: SHIPPED | OUTPATIENT
Start: 2022-02-07

## 2022-02-07 RX ORDER — LISINOPRIL 5 MG/1
5 TABLET ORAL DAILY
COMMUNITY
End: 2022-08-08 | Stop reason: ALTCHOICE

## 2022-02-07 ASSESSMENT — ENCOUNTER SYMPTOMS
COUGH: 0
APNEA: 0
COLOR CHANGE: 0
ABDOMINAL PAIN: 0
SHORTNESS OF BREATH: 1
CHEST TIGHTNESS: 0

## 2022-02-07 NOTE — PROGRESS NOTES
[facility] PRIMARY CARE  [address]  [facility] [address]  Dept: [phone]    Sumit Kelly is a 64 y.o. female who presents today for her medical conditions/complaints as noted below. Chief Complaint   Patient presents with    Medication Refill     meloxicam       HPI:     HPI  Vit D was low. Other labs are normal. Started Vit D     Had a positive stress test and then cardaic cath and no CAD. Have been working on BP. /95  Having BW for secondary HTN this week.      Wants Mobic refilled   LDL Cholesterol (mg/dL)   Date Value   2021 120       (goal LDL is <100)   AST (U/L)   Date Value   10/15/2021 21     ALT (U/L)   Date Value   10/15/2021 23     BUN (mg/dL)   Date Value   10/15/2021 20     BP Readings from Last 3 Encounters:   22 132/82   21 135/79   21 136/84          (goal 120/80)    Past Medical History:   Diagnosis Date    Fatigue     Hypertension       Past Surgical History:   Procedure Laterality Date    CARDIAC CATHETERIZATION  2021    CATARACT REMOVAL      EYE SURGERY Bilateral     eye muscles as a child    OVARY REMOVAL      parital for cyst    TONSILLECTOMY         Family History   Problem Relation Age of Onset    Other Mother         preforation of bowel    Diabetes Mother     Cancer Father         kidney       Social History     Tobacco Use    Smoking status: Former Smoker     Packs/day: 1.00     Years: 30.00     Pack years: 30.00     Types: Cigarettes     Quit date: 10/29/2014     Years since quittin.2    Smokeless tobacco: Never Used   Substance Use Topics    Alcohol use: Yes     Comment: socially      Current Outpatient Medications   Medication Sig Dispense Refill    lisinopril (PRINIVIL;ZESTRIL) 5 MG tablet Take 5 mg by mouth daily      carvedilol (COREG) 3.125 MG tablet Take 2 tablets by mouth 2 times daily (with meals) 60 tablet 3    acetaminophen (TYLENOL) 500 MG tablet Take 2 tablets by mouth 3 times daily as needed for Pain 540 tablet 1    aspirin 81 MG chewable tablet Take 81 mg by mouth daily      vitamin D (CHOLECALCIFEROL) 125 MCG (5000 UT) CAPS capsule Take 5,000 Units by mouth daily       No current facility-administered medications for this visit. No Known Allergies    Health Maintenance   Topic Date Due    Hepatitis C screen  Never done    COVID-19 Vaccine (1) Never done    HIV screen  Never done    Cervical cancer screen  Never done    Diabetes screen  Never done    Colon cancer screen colonoscopy  Never done    Shingles Vaccine (1 of 2) Never done    Low dose CT lung screening  Never done    Depression Screen  10/29/2022    Potassium monitoring  12/08/2022    Creatinine monitoring  12/08/2022    Breast cancer screen  11/15/2023    Lipid screen  11/02/2026    DTaP/Tdap/Td vaccine (3 - Td or Tdap) 12/27/2026    Flu vaccine  Completed    Hepatitis A vaccine  Aged Out    Hepatitis B vaccine  Aged Out    Hib vaccine  Aged Out    Meningococcal (ACWY) vaccine  Aged Out    Pneumococcal 0-64 years Vaccine  Aged Out       Subjective:      Review of Systems   Constitutional: Positive for fatigue. Respiratory: Positive for shortness of breath. Negative for apnea, cough and chest tightness. Cardiovascular: Negative for chest pain, palpitations and leg swelling. Gastrointestinal: Negative for abdominal pain. Musculoskeletal: Positive for arthralgias. Skin: Negative for color change. Neurological: Negative for dizziness, syncope, weakness, light-headedness and headaches. Psychiatric/Behavioral: Negative for sleep disturbance. The patient is not nervous/anxious. Objective:     /82   Pulse 88   Ht 5' 10\" (1.778 m)   Wt 232 lb 9.6 oz (105.5 kg)   SpO2 98%   BMI 33.37 kg/m²   Physical Exam  Vitals reviewed. Neck:      Vascular: No carotid bruit. Cardiovascular:      Rate and Rhythm: Normal rate and regular rhythm.       Heart sounds:

## 2022-02-11 ENCOUNTER — HOSPITAL ENCOUNTER (OUTPATIENT)
Age: 62
Discharge: HOME | End: 2022-02-11
Payer: COMMERCIAL

## 2022-02-11 LAB
ANION GAP SERPL CALCULATED.3IONS-SCNC: 10 MMOL/L (ref 9–17)
BUN BLDV-MCNC: 23 MG/DL (ref 8–23)
BUN/CREAT BLD: ABNORMAL (ref 9–20)
CALCIUM SERPL-MCNC: 9 MG/DL (ref 8.6–10.4)
CHLORIDE BLD-SCNC: 107 MMOL/L (ref 98–107)
CO2: 24 MMOL/L (ref 20–31)
CREAT SERPL-MCNC: 0.97 MG/DL (ref 0.5–0.9)
GFR AFRICAN AMERICAN: >60 ML/MIN
GFR NON-AFRICAN AMERICAN: 58 ML/MIN
GFR SERPL CREATININE-BSD FRML MDRD: ABNORMAL ML/MIN/{1.73_M2}
GFR SERPL CREATININE-BSD FRML MDRD: ABNORMAL ML/MIN/{1.73_M2}
GLUCOSE FASTING: 101 MG/DL (ref 70–99)
MAGNESIUM: 2.1 MG/DL (ref 1.6–2.6)
POTASSIUM SERPL-SCNC: 4.3 MMOL/L (ref 3.7–5.3)
SODIUM BLD-SCNC: 141 MMOL/L (ref 135–144)

## 2022-02-11 PROCEDURE — 80048 BASIC METABOLIC PNL TOTAL CA: CPT

## 2022-02-11 PROCEDURE — 83735 ASSAY OF MAGNESIUM: CPT

## 2022-02-11 PROCEDURE — 36415 COLL VENOUS BLD VENIPUNCTURE: CPT

## 2022-03-28 ENCOUNTER — HOSPITAL ENCOUNTER (OUTPATIENT)
Dept: LAB | Age: 62
Setting detail: SPECIMEN
Discharge: HOME | End: 2022-03-28
Payer: COMMERCIAL

## 2022-03-28 DIAGNOSIS — Z01.818 PREOP TESTING: Primary | ICD-10-CM

## 2022-03-28 PROCEDURE — U0003 INFECTIOUS AGENT DETECTION BY NUCLEIC ACID (DNA OR RNA); SEVERE ACUTE RESPIRATORY SYNDROME CORONAVIRUS 2 (SARS-COV-2) (CORONAVIRUS DISEASE [COVID-19]), AMPLIFIED PROBE TECHNIQUE, MAKING USE OF HIGH THROUGHPUT TECHNOLOGIES AS DESCRIBED BY CMS-2020-01-R: HCPCS

## 2022-03-28 PROCEDURE — U0005 INFEC AGEN DETEC AMPLI PROBE: HCPCS

## 2022-03-29 LAB
SARS-COV-2: NORMAL
SARS-COV-2: NOT DETECTED
SOURCE: NORMAL

## 2022-04-01 ENCOUNTER — HOSPITAL ENCOUNTER (OUTPATIENT)
Dept: PULMONOLOGY | Age: 62
Discharge: HOME | End: 2022-04-01
Payer: COMMERCIAL

## 2022-04-01 DIAGNOSIS — R06.09 DYSPNEA ON EXERTION: ICD-10-CM

## 2022-04-01 LAB
DLCO %PRED: NORMAL
DLCO PRED: NORMAL
DLCO/VA %PRED: NORMAL
DLCO/VA PRED: NORMAL
DLCO/VA: NORMAL
DLCO: NORMAL
EXPIRATORY TIME: NORMAL
FEF 25-75% %PRED-PRE: NORMAL
FEF 25-75% PRED: NORMAL
FEF 25-75%-PRE: NORMAL
FEV1 %PRED-PRE: NORMAL
FEV1 PRED: NORMAL
FEV1/FVC %PRED-PRE: NORMAL
FEV1/FVC PRED: NORMAL
FEV1/FVC: NORMAL
FEV1: NORMAL
FVC %PRED-PRE: NORMAL
FVC PRED: NORMAL
FVC: NORMAL
GAW %PRED: NORMAL
GAW PRED: NORMAL
GAW: NORMAL
IC %PRED: NORMAL
IC PRED: NORMAL
IC: NORMAL
MVV %PRED-PRE: NORMAL
MVV PRED: NORMAL
MVV-PRE: NORMAL
PEF %PRED-PRE: NORMAL
PEF PRED: NORMAL
PEF-PRE: NORMAL
RAW %PRED: NORMAL
RAW PRED: NORMAL
RAW: NORMAL
RV %PRED: NORMAL
RV PRED: NORMAL
RV: NORMAL
SVC %PRED: NORMAL
SVC PRED: NORMAL
SVC: NORMAL
TLC %PRED: NORMAL
TLC PRED: NORMAL
TLC: NORMAL
VA %PRED: NORMAL
VA PRED: NORMAL
VA: NORMAL
VTG %PRED: NORMAL
VTG PRED: NORMAL
VTG: NORMAL

## 2022-04-01 PROCEDURE — 94664 DEMO&/EVAL PT USE INHALER: CPT

## 2022-04-01 PROCEDURE — 94729 DIFFUSING CAPACITY: CPT

## 2022-04-01 PROCEDURE — 94726 PLETHYSMOGRAPHY LUNG VOLUMES: CPT

## 2022-04-01 PROCEDURE — 94375 RESPIRATORY FLOW VOLUME LOOP: CPT

## 2022-04-01 PROCEDURE — 94060 EVALUATION OF WHEEZING: CPT

## 2022-04-12 NOTE — PROCEDURES
Complete pulmonary function report. Flow-volume loops showed adequate effort  tracings. Spirometry is within normal limits. There is no improvement with bronchodilator therapy. Static lung volumes are within lower limits of normal.normal limits. The DLCO this within lower limits of normal.    Airway resistance is normal.    Summary,    Normal study.

## 2022-05-06 ENCOUNTER — HOSPITAL ENCOUNTER (OUTPATIENT)
Dept: PULMONOLOGY | Age: 62
Discharge: HOME | End: 2022-05-06
Payer: COMMERCIAL

## 2022-05-06 ENCOUNTER — TELEPHONE (OUTPATIENT)
Dept: PRIMARY CARE CLINIC | Age: 62
End: 2022-05-06

## 2022-05-06 DIAGNOSIS — R06.09 DYSPNEA ON EXERTION: ICD-10-CM

## 2022-05-06 LAB
SARS-COV-2, RAPID: DETECTED
SPECIMEN DESCRIPTION: ABNORMAL

## 2022-05-06 PROCEDURE — 87635 SARS-COV-2 COVID-19 AMP PRB: CPT

## 2022-05-06 NOTE — PROGRESS NOTES
Unable to perform pulmonary function testing or methacholine challenge on patient. Pt COVID screen comes back positive. Pt instructed to call scheduling to be rescheduled in 3 weeks.

## 2022-06-17 ENCOUNTER — HOSPITAL ENCOUNTER (OUTPATIENT)
Dept: PULMONOLOGY | Age: 62
Discharge: HOME | End: 2022-06-17
Payer: COMMERCIAL

## 2022-06-17 LAB
EXPIRATORY TIME-POST: NORMAL
EXPIRATORY TIME: NORMAL
FEF 25-75% %CHNG: NORMAL
FEF 25-75% %PRED-POST: NORMAL
FEF 25-75% %PRED-PRE: NORMAL
FEF 25-75% PRED: NORMAL
FEF 25-75%-POST: NORMAL
FEF 25-75%-PRE: NORMAL
FEV1 %PRED-POST: NORMAL %
FEV1 %PRED-PRE: NORMAL %
FEV1 PRED: NORMAL
FEV1-POST: NORMAL
FEV1-PRE: NORMAL
FEV1/FVC %PRED-POST: NORMAL
FEV1/FVC %PRED-PRE: NORMAL
FEV1/FVC PRED: NORMAL
FEV1/FVC-POST: NORMAL %
FEV1/FVC-PRE: NORMAL %
FVC %PRED-POST: NORMAL
FVC %PRED-PRE: NORMAL
FVC PRED: NORMAL
FVC-POST: NORMAL
FVC-PRE: NORMAL
PEF %PRED-POST: NORMAL
PEF %PRED-PRE: NORMAL
PEF PRED: NORMAL
PEF%CHNG: NORMAL
PEF-POST: NORMAL
PEF-PRE: NORMAL

## 2022-06-17 PROCEDURE — 94375 RESPIRATORY FLOW VOLUME LOOP: CPT

## 2022-06-17 PROCEDURE — 94070 EVALUATION OF WHEEZING: CPT

## 2022-06-17 PROCEDURE — 94060 EVALUATION OF WHEEZING: CPT

## 2022-06-17 PROCEDURE — 6360000002 HC RX W HCPCS: Performed by: PHYSICIAN ASSISTANT

## 2022-06-17 RX ORDER — ALBUTEROL SULFATE 2.5 MG/3ML
2.5 SOLUTION RESPIRATORY (INHALATION) ONCE
Status: COMPLETED | OUTPATIENT
Start: 2022-06-17 | End: 2022-06-17

## 2022-06-17 RX ADMIN — METHACHOLINE CHLORIDE 100 MG: 100 POWDER, FOR SOLUTION RESPIRATORY (INHALATION) at 16:23

## 2022-06-17 RX ADMIN — ALBUTEROL SULFATE 2.5 MG: 2.5 SOLUTION RESPIRATORY (INHALATION) at 16:57

## 2022-06-23 DIAGNOSIS — R06.09 DYSPNEA ON EXERTION: Primary | ICD-10-CM

## 2022-07-08 LAB
ANION GAP SERPL CALCULATED.3IONS-SCNC: 10 MMOL/L (ref 9–17)
BUN BLDV-MCNC: 19 MG/DL (ref 8–23)
CALCIUM SERPL-MCNC: 9.6 MG/DL (ref 8.6–10.4)
CHLORIDE BLD-SCNC: 101 MMOL/L (ref 98–107)
CO2: 28 MMOL/L (ref 20–31)
CREAT SERPL-MCNC: 0.9 MG/DL (ref 0.5–0.9)
GFR AFRICAN AMERICAN: >60 ML/MIN
GFR NON-AFRICAN AMERICAN: >60 ML/MIN
GFR SERPL CREATININE-BSD FRML MDRD: NORMAL ML/MIN/{1.73_M2}
GLUCOSE BLD-MCNC: 79 MG/DL (ref 70–99)
POTASSIUM SERPL-SCNC: 4.1 MMOL/L (ref 3.7–5.3)
SODIUM BLD-SCNC: 139 MMOL/L (ref 135–144)

## 2022-07-27 ENCOUNTER — HOSPITAL ENCOUNTER (OUTPATIENT)
Dept: CT IMAGING | Age: 62
Discharge: HOME | End: 2022-07-29
Payer: COMMERCIAL

## 2022-07-27 DIAGNOSIS — R06.09 DYSPNEA ON EXERTION: ICD-10-CM

## 2022-07-27 PROCEDURE — 2580000003 HC RX 258: Performed by: PHYSICIAN ASSISTANT

## 2022-07-27 PROCEDURE — 6360000004 HC RX CONTRAST MEDICATION: Performed by: PHYSICIAN ASSISTANT

## 2022-07-27 PROCEDURE — 71260 CT THORAX DX C+: CPT

## 2022-07-27 RX ORDER — SODIUM CHLORIDE 0.9 % (FLUSH) 0.9 %
10 SYRINGE (ML) INJECTION PRN
Status: DISCONTINUED | OUTPATIENT
Start: 2022-07-27 | End: 2022-07-30 | Stop reason: HOSPADM

## 2022-07-27 RX ORDER — 0.9 % SODIUM CHLORIDE 0.9 %
80 INTRAVENOUS SOLUTION INTRAVENOUS ONCE
Status: COMPLETED | OUTPATIENT
Start: 2022-07-27 | End: 2022-07-27

## 2022-07-27 RX ADMIN — SODIUM CHLORIDE, PRESERVATIVE FREE 10 ML: 5 INJECTION INTRAVENOUS at 16:15

## 2022-07-27 RX ADMIN — IOPAMIDOL 75 ML: 755 INJECTION, SOLUTION INTRAVENOUS at 16:11

## 2022-07-27 RX ADMIN — SODIUM CHLORIDE 80 ML: 9 INJECTION, SOLUTION INTRAVENOUS at 16:15

## 2022-08-08 ENCOUNTER — OFFICE VISIT (OUTPATIENT)
Dept: PRIMARY CARE CLINIC | Age: 62
End: 2022-08-08
Payer: COMMERCIAL

## 2022-08-08 VITALS
WEIGHT: 227 LBS | DIASTOLIC BLOOD PRESSURE: 80 MMHG | SYSTOLIC BLOOD PRESSURE: 134 MMHG | BODY MASS INDEX: 32.5 KG/M2 | HEART RATE: 82 BPM | HEIGHT: 70 IN | OXYGEN SATURATION: 98 %

## 2022-08-08 DIAGNOSIS — R06.02 SOB (SHORTNESS OF BREATH): ICD-10-CM

## 2022-08-08 DIAGNOSIS — R53.83 FATIGUE, UNSPECIFIED TYPE: ICD-10-CM

## 2022-08-08 DIAGNOSIS — R91.8 PULMONARY NODULES: ICD-10-CM

## 2022-08-08 DIAGNOSIS — R06.81 WITNESSED EPISODE OF APNEA: ICD-10-CM

## 2022-08-08 DIAGNOSIS — Z23 NEED FOR VACCINATION: Primary | ICD-10-CM

## 2022-08-08 PROCEDURE — 99213 OFFICE O/P EST LOW 20 MIN: CPT | Performed by: PHYSICIAN ASSISTANT

## 2022-08-08 PROCEDURE — G8417 CALC BMI ABV UP PARAM F/U: HCPCS | Performed by: PHYSICIAN ASSISTANT

## 2022-08-08 PROCEDURE — 3017F COLORECTAL CA SCREEN DOC REV: CPT | Performed by: PHYSICIAN ASSISTANT

## 2022-08-08 PROCEDURE — 1036F TOBACCO NON-USER: CPT | Performed by: PHYSICIAN ASSISTANT

## 2022-08-08 PROCEDURE — G8427 DOCREV CUR MEDS BY ELIG CLIN: HCPCS | Performed by: PHYSICIAN ASSISTANT

## 2022-08-08 PROCEDURE — 90471 IMMUNIZATION ADMIN: CPT | Performed by: PHYSICIAN ASSISTANT

## 2022-08-08 PROCEDURE — 90677 PCV20 VACCINE IM: CPT | Performed by: PHYSICIAN ASSISTANT

## 2022-08-08 RX ORDER — HYDROCHLOROTHIAZIDE 12.5 MG/1
TABLET ORAL
COMMUNITY
Start: 2022-06-17

## 2022-08-08 RX ORDER — AMLODIPINE BESYLATE 5 MG/1
TABLET ORAL
COMMUNITY
Start: 2022-07-27

## 2022-08-08 ASSESSMENT — PATIENT HEALTH QUESTIONNAIRE - PHQ9
SUM OF ALL RESPONSES TO PHQ9 QUESTIONS 1 & 2: 0
1. LITTLE INTEREST OR PLEASURE IN DOING THINGS: 0
SUM OF ALL RESPONSES TO PHQ QUESTIONS 1-9: 0
SUM OF ALL RESPONSES TO PHQ QUESTIONS 1-9: 0
2. FEELING DOWN, DEPRESSED OR HOPELESS: 0
SUM OF ALL RESPONSES TO PHQ QUESTIONS 1-9: 0
SUM OF ALL RESPONSES TO PHQ QUESTIONS 1-9: 0

## 2022-08-08 ASSESSMENT — ENCOUNTER SYMPTOMS
COUGH: 0
APNEA: 1
SHORTNESS OF BREATH: 1
CHEST TIGHTNESS: 0
WHEEZING: 0

## 2022-08-08 NOTE — PROGRESS NOTES
[address] [facility] PRIMARY CARE   Main Notice  [facility] [id number]  Dept: [phone]    Sofia Phillips is a 58 y.o. female who presents today for her medical conditions/complaints as noted below. Chief Complaint   Patient presents with    Results     Discuss CT results        HPI:     HPI  CT with contrast shows multiple pulmonary nodules, most severe is  less than 3 mm. CT shows mild centrilobular emphysematous changes at apices.     PFT and methacholine challenge test was negative for Asthma/COPD  Normal cardiac cath    LDL Cholesterol (mg/dL)   Date Value   2021 120       (goal LDL is <100)   AST (U/L)   Date Value   10/15/2021 21     ALT (U/L)   Date Value   10/15/2021 23     BUN (mg/dL)   Date Value   2022 19     BP Readings from Last 3 Encounters:   22 134/80   22 132/82   21 135/79          (goal 120/80)    Past Medical History:   Diagnosis Date    Fatigue     Hypertension       Past Surgical History:   Procedure Laterality Date    CARDIAC CATHETERIZATION  2021    CATARACT REMOVAL      EYE SURGERY Bilateral     eye muscles as a child    OVARY REMOVAL      parital for cyst    TONSILLECTOMY         Family History   Problem Relation Age of Onset    Other Mother         preforation of bowel    Diabetes Mother     Cancer Father         kidney       Social History     Tobacco Use    Smoking status: Former     Packs/day: 1.00     Years: 30.00     Pack years: 30.00     Types: Cigarettes     Quit date: 10/29/2014     Years since quittin.7    Smokeless tobacco: Never   Substance Use Topics    Alcohol use: Yes     Comment: socially      Current Outpatient Medications   Medication Sig Dispense Refill    amLODIPine (NORVASC) 5 MG tablet take 1 tablet by mouth once daily      hydroCHLOROthiazide (HYDRODIURIL) 12.5 MG tablet take 1 tablet by mouth once daily      acetaminophen (TYLENOL) 500 MG tablet Take 2 tablets by mouth 3 times daily as needed for Pain 540 tablet 1    vitamin D (CHOLECALCIFEROL) 125 MCG (5000 UT) CAPS capsule Take 5,000 Units by mouth daily      carvedilol (COREG) 3.125 MG tablet Take 2 tablets by mouth 2 times daily (with meals) (Patient taking differently: Take 3.125 mg by mouth in the morning and 3.125 mg in the evening. Take with meals.) 60 tablet 3     No current facility-administered medications for this visit. No Known Allergies    Health Maintenance   Topic Date Due    HIV screen  Never done    Hepatitis C screen  Never done    Cervical cancer screen  Never done    Shingles vaccine (1 of 2) Never done    Low dose CT lung screening  Never done    Colorectal Cancer Screen  12/05/2021    COVID-19 Vaccine (4 - Booster for Pfizer series) 05/09/2022    Flu vaccine (1) 09/01/2022    Depression Screen  10/29/2022    Breast cancer screen  11/15/2023    Lipids  11/02/2026    DTaP/Tdap/Td vaccine (3 - Td or Tdap) 12/27/2026    Hepatitis A vaccine  Aged Out    Hepatitis B vaccine  Aged Out    Hib vaccine  Aged Out    Meningococcal (ACWY) vaccine  Aged Out    Pneumococcal 0-64 years Vaccine  Aged Out       Subjective:      Review of Systems   Constitutional:  Positive for fatigue. Negative for chills, diaphoresis and fever. Respiratory:  Positive for apnea and shortness of breath. Negative for cough, chest tightness and wheezing. Cardiovascular:  Negative for chest pain, palpitations and leg swelling. Hematological:  Negative for adenopathy. Objective:     /80   Pulse 82   Ht 5' 10\" (1.778 m)   Wt 227 lb (103 kg)   SpO2 98%   BMI 32.57 kg/m²   Physical Exam  Vitals and nursing note reviewed. Constitutional:       Appearance: Normal appearance. Cardiovascular:      Rate and Rhythm: Normal rate and regular rhythm. Heart sounds: Normal heart sounds. Pulmonary:      Effort: Pulmonary effort is normal.      Breath sounds: Normal breath sounds. No wheezing, rhonchi or rales.    Neurological: Mental Status: She is alert and oriented to person, place, and time. Psychiatric:         Mood and Affect: Mood normal.       Assessment:       Diagnosis Orders   1. Need for vaccination  Pneumococcal, PCV20, PREVNAR 21, (age 25 yrs+), IM, PF      2. SOB (shortness of breath)  [facility] [place]    Baseline Diagnostic Sleep Study      3. Witnessed episode of apnea  [facility] [place]    Baseline Diagnostic Sleep Study      4. Fatigue, unspecified type  [facility] [place]    Baseline Diagnostic Sleep Study      5. Pulmonary nodules               Plan:    Call for [facility] note  Reviewed CT scan with patient  Referral to pulmo  Ordered sleep study  Return in about 3 months (around 11/8/2022) for recheck. Orders Placed This Encounter   Procedures    Pneumococcal, PCV20, PREVNAR 21, (age 25 yrs+), IM, PF    [facility], [place]     Referral Priority:   Routine     Referral Type:   Eval and Treat     Referral Reason:   Specialty Services Required     Requested Specialty:   Pulmonology     Number of Visits Requested:   1    Baseline Diagnostic Sleep Study     Standing Status:   Future     Standing Expiration Date:   8/8/2023     Order Specific Question:   Adult or Pediatric     Answer:   Adult Study (>7 Years)     Order Specific Question:   Location For Sleep Study     Answer: [facility] Specific Question:   Select Sleep Lab Location     Answer:   [facility]     Order Specific Question:   Pre-Study Patient Questions: Answer:   Snores loudly during sleep     Order Specific Question:   Pre-Study Patient Questions: Answer: Others have witnessed episodes of apnea while the patient sleeps       No orders of the defined types were placed in this encounter. Patient given educational materials - see patient instructions. Discussed use, benefit, and side effects of prescribed medications.   All patient questions answered. Pt voiced understanding. Reviewed health maintenance. Instructed to continue current medications, diet and exercise. Patient agreed with treatment plan. Follow up as directed.      Electronically signed by Anum Lan PA-C on 8/8/2022 at 3:56 PM

## 2022-09-27 ENCOUNTER — HOSPITAL ENCOUNTER (OUTPATIENT)
Dept: SLEEP CENTER | Age: 62
Discharge: HOME | End: 2022-09-29
Payer: COMMERCIAL

## 2022-09-27 DIAGNOSIS — R53.83 FATIGUE, UNSPECIFIED TYPE: ICD-10-CM

## 2022-09-27 DIAGNOSIS — R06.02 SOB (SHORTNESS OF BREATH): ICD-10-CM

## 2022-09-27 DIAGNOSIS — R06.81 WITNESSED EPISODE OF APNEA: ICD-10-CM

## 2022-09-27 PROCEDURE — 95810 POLYSOM 6/> YRS 4/> PARAM: CPT

## 2022-09-27 NOTE — PAYOR INFORMATION
Verified Demographics with Patient? No   If no why? Already verified  [address] Completed? No    If not why? Already completed  Verified Insurance through: Already verified  [employer] Completed? Not required  Auth Verification #:[id number]  Liability Due: $638.79  Discount Offered: na%       Previous Balance: $ 0   Discount Offered: na%  Site Collect Status: pt refused- prefers to be billed  Patient Response: pt refused- prefers to be billed  Financial Aid Offered?  Yes Pt declined  Cards Scanned: yes

## 2022-09-28 VITALS
RESPIRATION RATE: 12 BRPM | BODY MASS INDEX: 32.5 KG/M2 | WEIGHT: 227 LBS | OXYGEN SATURATION: 94 % | HEIGHT: 70 IN | HEART RATE: 64 BPM

## 2022-09-28 ASSESSMENT — SLEEP AND FATIGUE QUESTIONNAIRES
HOW LIKELY ARE YOU TO NOD OFF OR FALL ASLEEP WHILE SITTING AND READING: 3
HOW LIKELY ARE YOU TO NOD OFF OR FALL ASLEEP IN A CAR, WHILE STOPPED FOR A FEW MINUTES IN TRAFFIC: 0
HOW LIKELY ARE YOU TO NOD OFF OR FALL ASLEEP WHILE WATCHING TV: 3
HOW LIKELY ARE YOU TO NOD OFF OR FALL ASLEEP WHILE LYING DOWN TO REST IN THE AFTERNOON WHEN CIRCUMSTANCES PERMIT: 3
ESS TOTAL SCORE: 17
HOW LIKELY ARE YOU TO NOD OFF OR FALL ASLEEP WHEN YOU ARE A PASSENGER IN A CAR FOR AN HOUR WITHOUT A BREAK: 3
HOW LIKELY ARE YOU TO NOD OFF OR FALL ASLEEP WHILE SITTING QUIETLY AFTER LUNCH WITHOUT ALCOHOL: 2
HOW LIKELY ARE YOU TO NOD OFF OR FALL ASLEEP WHILE SITTING INACTIVE IN A PUBLIC PLACE: 2
HOW LIKELY ARE YOU TO NOD OFF OR FALL ASLEEP WHILE SITTING AND TALKING TO SOMEONE: 1

## 2022-10-05 LAB — STATUS: NORMAL

## 2022-10-05 PROCEDURE — 95810 POLYSOM 6/> YRS 4/> PARAM: CPT | Performed by: PSYCHIATRY & NEUROLOGY

## 2022-10-09 DIAGNOSIS — G47.33 OSA (OBSTRUCTIVE SLEEP APNEA): Primary | ICD-10-CM

## 2022-10-18 ENCOUNTER — HOSPITAL ENCOUNTER (OUTPATIENT)
Dept: SLEEP CENTER | Age: 62
Discharge: HOME | End: 2022-10-20
Payer: COMMERCIAL

## 2022-10-18 DIAGNOSIS — G47.33 OSA (OBSTRUCTIVE SLEEP APNEA): ICD-10-CM

## 2022-10-18 PROCEDURE — 95811 POLYSOM 6/>YRS CPAP 4/> PARM: CPT

## 2022-10-18 NOTE — PAYOR INFORMATION
Verified Demographics with Patient? No   If no why? Already verified  [facility] Completed? No    If not why? Already completed  Verified Insurance through: Already verified  [employer] Completed? Not required  Auth Verification #:[id number]  Liability Due: $638.79  Discount Offered: 0%       Previous Balance: $ 542.88   Discount Offered: na%  Site Collect Status: pt refused- prefers to be billed   Patient Response: pt refused- prefers to be billed  Financial Aid Offered?  Yes Pt declined  Cards Scanned: yes- at last visit

## 2022-10-19 VITALS
HEART RATE: 55 BPM | HEIGHT: 70 IN | BODY MASS INDEX: 32.5 KG/M2 | RESPIRATION RATE: 14 BRPM | WEIGHT: 227 LBS | OXYGEN SATURATION: 96 %

## 2022-10-25 ENCOUNTER — OFFICE VISIT (OUTPATIENT)
Dept: PULMONOLOGY | Age: 62
End: 2022-10-25
Payer: COMMERCIAL

## 2022-10-25 VITALS
HEIGHT: 70 IN | SYSTOLIC BLOOD PRESSURE: 121 MMHG | HEART RATE: 69 BPM | RESPIRATION RATE: 20 BRPM | OXYGEN SATURATION: 97 % | DIASTOLIC BLOOD PRESSURE: 68 MMHG | BODY MASS INDEX: 32.93 KG/M2 | WEIGHT: 230 LBS

## 2022-10-25 DIAGNOSIS — G47.33 OSA (OBSTRUCTIVE SLEEP APNEA): ICD-10-CM

## 2022-10-25 DIAGNOSIS — I10 ESSENTIAL HYPERTENSION: ICD-10-CM

## 2022-10-25 DIAGNOSIS — R91.8 MULTIPLE NODULES OF LUNG: Primary | ICD-10-CM

## 2022-10-25 DIAGNOSIS — G47.61 PERIODIC LIMB MOVEMENT SLEEP DISORDER: ICD-10-CM

## 2022-10-25 DIAGNOSIS — J44.9 CHRONIC OBSTRUCTIVE PULMONARY DISEASE, UNSPECIFIED COPD TYPE (HCC): ICD-10-CM

## 2022-10-25 DIAGNOSIS — E66.09 OBESITY DUE TO EXCESS CALORIES, UNSPECIFIED OBESITY SEVERITY: ICD-10-CM

## 2022-10-25 LAB — STATUS: NORMAL

## 2022-10-25 PROCEDURE — 3078F DIAST BP <80 MM HG: CPT | Performed by: INTERNAL MEDICINE

## 2022-10-25 PROCEDURE — 99204 OFFICE O/P NEW MOD 45 MIN: CPT | Performed by: INTERNAL MEDICINE

## 2022-10-25 PROCEDURE — 3074F SYST BP LT 130 MM HG: CPT | Performed by: INTERNAL MEDICINE

## 2022-10-25 PROCEDURE — G8427 DOCREV CUR MEDS BY ELIG CLIN: HCPCS | Performed by: INTERNAL MEDICINE

## 2022-10-25 PROCEDURE — 3023F SPIROM DOC REV: CPT | Performed by: INTERNAL MEDICINE

## 2022-10-25 PROCEDURE — G8417 CALC BMI ABV UP PARAM F/U: HCPCS | Performed by: INTERNAL MEDICINE

## 2022-10-25 PROCEDURE — 1036F TOBACCO NON-USER: CPT | Performed by: INTERNAL MEDICINE

## 2022-10-25 PROCEDURE — G8482 FLU IMMUNIZE ORDER/ADMIN: HCPCS | Performed by: INTERNAL MEDICINE

## 2022-10-25 PROCEDURE — 3017F COLORECTAL CA SCREEN DOC REV: CPT | Performed by: INTERNAL MEDICINE

## 2022-10-25 RX ORDER — ALBUTEROL SULFATE 90 UG/1
2 AEROSOL, METERED RESPIRATORY (INHALATION) EVERY 6 HOURS PRN
Qty: 18 G | Refills: 3 | Status: SHIPPED | OUTPATIENT
Start: 2022-10-25

## 2022-10-25 NOTE — PROGRESS NOTES
[address] [facility] RESPIRATORY SPECIALISTS  [facility] [address]  [facility] [address]  Dept: [phone]  Dept Fax: [phone]      10/25/22    Patient: Dimitri Colon  YOB: 1960    Dear Alexys Bethea PA-C,    I had the pleasure of seeing one of your patients, Navin Patel today in the office today. Please find attached my note with the assessment and plan of care. Thank you for allowing me to participate in the care of this patient. I will keep you updated on this patient's follow up and I look forward to serving you and your patients again in the future.     Wallace Isaacs MD  10/25/2022 3:11 PM

## 2022-10-25 NOTE — PROGRESS NOTES
PULMONARY  CONSULTATION        REFERRED BY: Alexandra Sheth PA-C    REASON FOR CONSULTATION: Obstructive sleep apnea and lung nodules    HISTORY OF PRESENT ILLNESS:    Christy Vargas is a 58y.o. year old female here for evaluation of above problems  Patient had a polysomnogram done in September 2022 that showed moderate to severe sleep apnea with an AHI of 17.5 that responded to CPAP at 11 cm of water.   Patient also has been observed her periodic leg movements  Patient also had a pulmonary function test done in April 2022 that showed stage I COPD with an FEV1 of 2.45 L  Patient has class II dyspnea  Denies any wheezing or cough  No chest pain or pressure  No orthopnea, PND or increasing pedal edema  No hemoptysis  No exposure to tuberculosis  No occupational exposures or hobbies or pets that would cause lung disease  Patient had a CT scan of the chest done in July 2022 that showed changes of emphysema and a 3 mm lingular and left lower lobe pulmonary nodules and intrapulmonary lymph nodes in the left lower lobe along the major fissure which were unchanged from October 2021      PAST MEDICAL HISTORY:       Diagnosis Date    Fatigue     Hypertension        SURGICAL HISTORY:    Past Surgical History:   Procedure Laterality Date    CARDIAC CATHETERIZATION  12/08/2021    CATARACT REMOVAL      EYE SURGERY Bilateral     eye muscles as a child    OVARY REMOVAL  2000    [facility] for cyst    TONSILLECTOMY         ALLERGIES:  No Known Allergies    MEDICATIONS:   Current Outpatient Medications   Medication Sig Dispense Refill    amLODIPine (NORVASC) 5 MG tablet take 1 tablet by mouth once daily      hydroCHLOROthiazide (HYDRODIURIL) 12.5 MG tablet take 1 tablet by mouth once daily      carvedilol (COREG) 3.125 MG tablet Take 2 tablets by mouth 2 times daily (with meals) (Patient taking differently: Take 3.125 mg by mouth 2 times daily (with meals)) 60 tablet 3    acetaminophen (TYLENOL) 500 MG tablet Take 2 tablets by mouth 3 times daily as needed for Pain 540 tablet 1    vitamin D (CHOLECALCIFEROL) 125 MCG (5000 UT) CAPS capsule Take 5,000 Units by mouth daily       No current facility-administered medications for this visit. FAMILY HISTORY: family history includes Cancer in her father; Diabetes in her mother; Other in her mother. SOCIAL AND OCCUPATIONAL HEALTH:  The patient is a Past smoker of 30 pack years and quit smoking in October 2014. There  is not history of TB or TB exposure. There is not asbestos or silica dust exposure. The patient reports does not have coal, foundry, quarry or [employer] exposure. Travel history reveals no significant history of risk factors for pulm disease. There is not  history of recreational or IV drug use. The patient does not have pets, dogs, cats turtles or exotic birds.         Review of Systems:  Review of Systems -   General ROS: Completed and except as mentioned above were negative   Psychological ROS:  Completed and except as mentioned above were negative  Ophthalmic ROS:  Completed and except as mentioned above were negative  ENT ROS:  Completed and except as mentioned above were negative  Allergy and Immunology ROS:  Completed and except as mentioned above were negative  Hematological and Lymphatic ROS:  Completed and except as mentioned above were negative  Endocrine ROS: Completed and except as mentioned above were negative  Breast ROS:  Completed and except as mentioned above were negative  Respiratory ROS:  Completed and except as mentioned above were negative  Cardiovascular ROS:  Completed and except as mentioned above were negative  Gastrointestinal ROS: Completed and except as mentioned above were negative  Genito-Urinary ROS:  Completed and except as mentioned above were negative  Musculoskeletal ROS:  Completed and except as mentioned above were negative  Neurological ROS:  Completed and except as mentioned above were negative  Dermatological ROS: Completed and except as mentioned above were negative    SLEEP  No epistaxis sor sore throat. has fatigue, sleepiness andtiredness in am. not using PAP as recommended as the patient does not have a machine yet. No MVA. No edema. PHYSICAL EXAMINATION:  Vitals:    10/25/22 1510   BP: 121/68   Pulse: 69   Resp: 20   SpO2: 97%   Weight: 230 lb (104.3 kg)   Height: 5' 10\" (1.778 m)     PHYSICAL EXAMINATION:  Vitals:    10/25/22 1510   BP: 121/68   Pulse: 69   Resp: 20   SpO2: 97%   Weight: 230 lb (104.3 kg)   Height: 5' 10\" (1.778 m)     Constitutional: This is a well developed, well nourished, 30-34.9 - Obesity Grade I 58y.o. year old female who is alert, oriented, cooperative and in no apparent distress. Head:normocephalic and atraumatic. EENT:  PRABHJOT. No conjunctival injections. Septum was midline, mucosa was without erythema, exudates or cobblestoning. No thrush was noted. MallampatiIII (soft palate, base of uvula visible)  Neck: Supple without thyromegaly. No elevated JVP. Trachea was midline. Respiratory: Chest was symmetrical without dullness to percussion. Breath sounds bilaterally were clear to auscultation. There were no wheezes, rhonchi or rales. There is no intercostal retraction or use of accessory muscles. No egophony noted. Cardiovascular: Regular without murmur, clicks, gallops or rubs. Abdomen: Slightly rounded and soft without organomegaly. No rebound, rigidity or guarding was appreciated. Lymphatic: No lymphadenopathy. Musculoskeletal: Normal curvature of the spine. No gross muscle weakness. Extremities:  does not have lower extremity edema,  no ulcerations, tenderness, varicosities or erythema. Muscle size, tone and strength are normal.  No involuntary movements are noted. Skin:  Warm and dry. Good color, turgor and pigmentation. No lesions or scars.   No cyanosis or clubbing  Neurological/Psychiatric: The patient's general behavior, level of consciousness, thought content and emotional status is normal.          DATA:     Pulmonary function tests: obstructive ventilatory defect in April 2022 showed stage I COPD with an FEV1 of 2.45 L    Polysomnogram done in September 2022 showed moderate to severe sleep apnea with an AHI of 17.5 that responded to CPAP of 11 cm of water and also has periodic leg movements    CT scan of the chest was done on July 27, 2022 and it showed-    No acute pulmonary process with very mild centrilobular emphysematous change   at the apices. 3 mm lingular and left lower lobe pulmonary nodules as well as 2 likely   intrapulmonary lymph nodes in the left lower lobe along the major fissure   which are unchanged from prior comparison and require no follow-up per   Fleischner society guidelines. IMPRESSION:   1. Multiple nodules of lung    2. Obesity due to excess calories, unspecified obesity severity    3. JUAN CARLOS (obstructive sleep apnea)    4. Essential hypertension    5. Periodic limb movement sleep disorder    6. Chronic obstructive pulmonary disease, unspecified COPD type ([facility]                   PLAN:       Reviewed the pulmonary function test, sleep study and also CT scan of the chest  Refills were provided -Albuterol to use as needed and an auto CPAP machine with pressure range of 8 to 15 cm of water with a nasal mask and supplies  Patient was recommended to have prednisone and an antibiotic available for use during an exacerbation  Educated and clarified the medication use. Discussed use, benefit, and side effects of prescribed medications. Barriers to medication compliance addressed. Shahlabetseygwen Pereira received counseling on the following healthy behaviors: nutrition, exercise and medication adherence  Recommend flu vaccination in the fall annually. Patient had flu vaccination for the season  Recommendations given regarding pneumococcal vaccinations.   Recommend COVID-19 omicron booster vaccination  Patient is not uptodate with vaccinations from pulmonary perspective. Maintain an active lifestyle. continue smoking cessation. Patient was educated on how to use the respiratory medications. All the questions that the patient has had were answered to   her satisfaction. Home O2 evaluation was done. Supplemental oxygen was not indicated  Pt met the criteria for lung cancer screening and was explained the possibility of having findings that would need monitoring such as lung nodules and the need for more than yearly screening ct chest. Pt acknowledges understanding and questions answered to their satisfaction. Auto CPAP with a pressure range of 8-15 cm of water to be used for at least 4 hours every night. Use humidifier if needed. Weight loss was recommended and discussed. Recommended following good sleep hygiene instructions. Explained importance of compliance with treatment of sleep apnea. Pt is not to drive if sleepy. We'll see the patient back in 6 months or earlier if needed. Patient will call us if she is sick, so she can be seen sooner. Thank you for having us involved in the care of your patient. Please call us if you have any questions or concerns.               Criss Florez MD MD  10/25/2022 3:19 PM

## 2022-10-26 ENCOUNTER — TELEPHONE (OUTPATIENT)
Dept: PULMONOLOGY | Age: 62
End: 2022-10-26

## 2022-11-01 NOTE — TELEPHONE ENCOUNTER
Pt called office, stated [facility] didn't receive order/note - dictation not completed. Will you please inform pt when you send signed/completed office note and order - assuming they will not process until completed? ?      Thanks

## 2023-01-23 ENCOUNTER — OFFICE VISIT (OUTPATIENT)
Dept: PRIMARY CARE CLINIC | Age: 63
End: 2023-01-23
Payer: COMMERCIAL

## 2023-01-23 VITALS
DIASTOLIC BLOOD PRESSURE: 72 MMHG | WEIGHT: 232 LBS | BODY MASS INDEX: 33.21 KG/M2 | HEART RATE: 53 BPM | SYSTOLIC BLOOD PRESSURE: 124 MMHG | HEIGHT: 70 IN | OXYGEN SATURATION: 99 %

## 2023-01-23 DIAGNOSIS — Z23 NEED FOR VACCINATION: ICD-10-CM

## 2023-01-23 DIAGNOSIS — K63.5 POLYP OF COLON, UNSPECIFIED PART OF COLON, UNSPECIFIED TYPE: ICD-10-CM

## 2023-01-23 DIAGNOSIS — G89.29 CHRONIC PAIN OF BOTH ANKLES: Primary | ICD-10-CM

## 2023-01-23 DIAGNOSIS — M25.572 CHRONIC PAIN OF BOTH ANKLES: Primary | ICD-10-CM

## 2023-01-23 DIAGNOSIS — M25.571 CHRONIC PAIN OF BOTH ANKLES: Primary | ICD-10-CM

## 2023-01-23 DIAGNOSIS — Z12.11 COLON CANCER SCREENING: ICD-10-CM

## 2023-01-23 PROCEDURE — 90750 HZV VACC RECOMBINANT IM: CPT | Performed by: PHYSICIAN ASSISTANT

## 2023-01-23 PROCEDURE — 3078F DIAST BP <80 MM HG: CPT | Performed by: PHYSICIAN ASSISTANT

## 2023-01-23 PROCEDURE — 1036F TOBACCO NON-USER: CPT | Performed by: PHYSICIAN ASSISTANT

## 2023-01-23 PROCEDURE — 90471 IMMUNIZATION ADMIN: CPT | Performed by: PHYSICIAN ASSISTANT

## 2023-01-23 PROCEDURE — G8482 FLU IMMUNIZE ORDER/ADMIN: HCPCS | Performed by: PHYSICIAN ASSISTANT

## 2023-01-23 PROCEDURE — 99213 OFFICE O/P EST LOW 20 MIN: CPT | Performed by: PHYSICIAN ASSISTANT

## 2023-01-23 PROCEDURE — 3074F SYST BP LT 130 MM HG: CPT | Performed by: PHYSICIAN ASSISTANT

## 2023-01-23 PROCEDURE — G8427 DOCREV CUR MEDS BY ELIG CLIN: HCPCS | Performed by: PHYSICIAN ASSISTANT

## 2023-01-23 PROCEDURE — 3017F COLORECTAL CA SCREEN DOC REV: CPT | Performed by: PHYSICIAN ASSISTANT

## 2023-01-23 PROCEDURE — G8417 CALC BMI ABV UP PARAM F/U: HCPCS | Performed by: PHYSICIAN ASSISTANT

## 2023-01-23 SDOH — ECONOMIC STABILITY: FOOD INSECURITY: WITHIN THE PAST 12 MONTHS, YOU WORRIED THAT YOUR FOOD WOULD RUN OUT BEFORE YOU GOT MONEY TO BUY MORE.: NEVER TRUE

## 2023-01-23 SDOH — ECONOMIC STABILITY: FOOD INSECURITY: WITHIN THE PAST 12 MONTHS, THE FOOD YOU BOUGHT JUST DIDN'T LAST AND YOU DIDN'T HAVE MONEY TO GET MORE.: NEVER TRUE

## 2023-01-23 ASSESSMENT — PATIENT HEALTH QUESTIONNAIRE - PHQ9
SUM OF ALL RESPONSES TO PHQ QUESTIONS 1-9: 0
SUM OF ALL RESPONSES TO PHQ9 QUESTIONS 1 & 2: 0
SUM OF ALL RESPONSES TO PHQ QUESTIONS 1-9: 0
1. LITTLE INTEREST OR PLEASURE IN DOING THINGS: 0
2. FEELING DOWN, DEPRESSED OR HOPELESS: 0
SUM OF ALL RESPONSES TO PHQ QUESTIONS 1-9: 0
SUM OF ALL RESPONSES TO PHQ QUESTIONS 1-9: 0

## 2023-01-23 ASSESSMENT — SOCIAL DETERMINANTS OF HEALTH (SDOH): HOW HARD IS IT FOR YOU TO PAY FOR THE VERY BASICS LIKE FOOD, HOUSING, MEDICAL CARE, AND HEATING?: NOT HARD AT ALL

## 2023-01-23 ASSESSMENT — ENCOUNTER SYMPTOMS: RESPIRATORY NEGATIVE: 1

## 2023-01-23 NOTE — PROGRESS NOTES
[facility]  [address]  Phone: [phone]  Fax: [phone]    Shane Sanchez is a 58 y.o. female who presents today for her medical conditions/complaintsas noted below. Chief Complaint   Patient presents with    Joint Swelling     Bilateral ankle swelling x 2 weeks. HPI:     HPI  Uses a stand up desk for half the day. Ankles ache after sitting for awhile, even at night   Cardiologist did double the water pill which has not helped swelling and he ordered BMP to check kidneys  Did just start a CPAP and has made a good difference in her fatigue  Walks 3-4 days a week   Also has Achilles pain  Stiffness in ankles    Current Outpatient Medications   Medication Sig Dispense Refill    amLODIPine (NORVASC) 5 MG tablet take 1 tablet by mouth once daily      hydroCHLOROthiazide (HYDRODIURIL) 12.5 MG tablet 25 mg      carvedilol (COREG) 3.125 MG tablet Take 2 tablets by mouth 2 times daily (with meals) (Patient taking differently: Take 3.125 mg by mouth 2 times daily (with meals)) 60 tablet 3    vitamin D (CHOLECALCIFEROL) 125 MCG (5000 UT) CAPS capsule Take 5,000 Units by mouth daily      albuterol sulfate HFA (PROAIR HFA) 108 (90 Base) MCG/ACT inhaler Inhale 2 puffs into the lungs every 6 hours as needed for Wheezing (Patient not taking: Reported on 1/23/2023) 18 g 3    acetaminophen (TYLENOL) 500 MG tablet Take 2 tablets by mouth 3 times daily as needed for Pain 540 tablet 1     No current facility-administered medications for this visit. No Known Allergies    Subjective:      Review of Systems   Constitutional:  Negative for chills, diaphoresis and fever. Respiratory: Negative. Cardiovascular: Negative. Musculoskeletal:  Positive for arthralgias (ankles) and joint swelling. Negative for gait problem. Stiffness, no redness or heat. Swelling especially by end of day   Skin:  Negative for rash.      Objective:     /72 Pulse 53   Ht 5' 10\" (1.778 m)   Wt 232 lb (105.2 kg)   SpO2 99%   BMI 33.29 kg/m²   Physical Exam  Vitals and nursing note reviewed. Constitutional:       Appearance: Normal appearance. She is obese. Cardiovascular:      Rate and Rhythm: Normal rate. Pulses:           Dorsalis pedis pulses are 2+ on the right side and 2+ on the left side. Pulmonary:      Effort: Pulmonary effort is normal.   Musculoskeletal:      Right ankle: Swelling present. No deformity, ecchymosis or lacerations. No tenderness. Normal range of motion. Anterior drawer test negative. Normal pulse. Left ankle: Swelling present. No deformity, ecchymosis or lacerations. No tenderness. Normal range of motion. Anterior drawer test negative. Normal pulse. Right foot: Normal range of motion. Comments: Swelling is at bottom of leg not around ankle   Feet:      Right foot:      Skin integrity: Skin integrity normal.   Neurological:      Mental Status: She is alert and oriented to person, place, and time. Psychiatric:         Mood and Affect: Mood normal.       Assessment:       Diagnosis Orders   1. Chronic pain of both ankles  XR ANKLE LEFT (MIN 3 VIEWS)    XR ANKLE RIGHT (MIN 3 VIEWS)      2. Need for vaccination  Zoster, SHINGRIX, (50 yrs +), IM      3. Colon cancer screening        4. Polyp of colon, unspecified part of colon, unspecified type  External Referral To Gastroenterology           Plan:    Tylenol and Aspercreme   Wear ankle braces daily especially for walks  X-rays ordered  Possible PT  May need to see Ortho for Achilles if above not helpful  Colon cancer screening--referral given  Shingrix today. Return in about 6 months (around 7/23/2023), or if symptoms worsen or fail to improve, for recheck, Will call with results.     Orders Placed This Encounter   Procedures    XR ANKLE LEFT (MIN 3 VIEWS)     Standing Status:   Future     Standing Expiration Date:   1/23/2024     Order Specific Question:   Reason for exam:     Answer:   pain    XR ANKLE RIGHT (MIN 3 VIEWS)     Standing Status:   Future     Standing Expiration Date:   1/23/2024     Order Specific Question:   Reason for exam:     Answer:   pain    Zoster, SHINGRIX, (50 yrs +), IM    External Referral To Gastroenterology     Referral Priority:   Routine     Referral Type:   Eval and Treat     Referral Reason:   Specialty Services Required     Referred to Provider:   Mignon Schulte MD     Requested Specialty:   Gastroenterology     Number of Visits Requested:   1     No orders of the defined types were placed in this encounter.           Electronically signed by Verónica Fabian 1/23/2023 at 9:37 AM

## 2023-01-24 LAB
ANION GAP SERPL CALCULATED.3IONS-SCNC: 11 MMOL/L (ref 9–17)
BUN BLDV-MCNC: 20 MG/DL (ref 8–23)
CALCIUM SERPL-MCNC: 10.1 MG/DL (ref 8.6–10.4)
CHLORIDE BLD-SCNC: 101 MMOL/L (ref 98–107)
CO2: 28 MMOL/L (ref 20–31)
CREAT SERPL-MCNC: 1.03 MG/DL (ref 0.5–0.9)
GFR SERPL CREATININE-BSD FRML MDRD: >60 ML/MIN/1.73M2
GLUCOSE BLD-MCNC: 92 MG/DL (ref 70–99)
POTASSIUM SERPL-SCNC: 4.1 MMOL/L (ref 3.7–5.3)
SODIUM BLD-SCNC: 140 MMOL/L (ref 135–144)

## 2023-02-01 ENCOUNTER — HOSPITAL ENCOUNTER (OUTPATIENT)
Age: 63
Discharge: HOME | End: 2023-02-03
Payer: COMMERCIAL

## 2023-02-01 ENCOUNTER — HOSPITAL ENCOUNTER (OUTPATIENT)
Dept: GENERAL RADIOLOGY | Age: 63
Discharge: HOME | End: 2023-02-03
Payer: COMMERCIAL

## 2023-02-01 DIAGNOSIS — M25.571 CHRONIC PAIN OF BOTH ANKLES: ICD-10-CM

## 2023-02-01 DIAGNOSIS — G89.29 CHRONIC PAIN OF BOTH ANKLES: ICD-10-CM

## 2023-02-01 DIAGNOSIS — M25.572 CHRONIC PAIN OF BOTH ANKLES: ICD-10-CM

## 2023-02-01 PROCEDURE — 73610 X-RAY EXAM OF ANKLE: CPT

## 2023-02-03 DIAGNOSIS — M25.571 CHRONIC PAIN OF BOTH ANKLES: Primary | ICD-10-CM

## 2023-02-03 DIAGNOSIS — M25.572 CHRONIC PAIN OF BOTH ANKLES: Primary | ICD-10-CM

## 2023-02-03 DIAGNOSIS — G89.29 CHRONIC PAIN OF BOTH ANKLES: Primary | ICD-10-CM

## 2023-02-14 ENCOUNTER — HOSPITAL ENCOUNTER (OUTPATIENT)
Dept: PHYSICAL THERAPY | Facility: CLINIC | Age: 63
Setting detail: THERAPIES SERIES
Discharge: HOME | End: 2023-02-14
Payer: COMMERCIAL

## 2023-02-14 PROCEDURE — 97110 THERAPEUTIC EXERCISES: CPT

## 2023-02-14 PROCEDURE — 97161 PT EVAL LOW COMPLEX 20 MIN: CPT

## 2023-02-14 NOTE — CONSULTS
[] [facility]) - [facility] &  Therapy  [address]  [phone]  F: [phone] [] [address]  [facility]   [address]  P: [phone]  F: [phone] [x] [address] &  Therapy  [address]  P: [phone]  F: [phone] [] [address]  P: [phone]  F: [phone] [] [address]  [facility]   [address]   [place]: [phone]  F: [phone]      Physical Therapy Lower Extremity Evaluation    Date:  2023  Patient: Lisa Santiago  : 1960  MRN: [id number]  Physician: NANCY Ramires   Insurance: MMO (visits BMN)  Medical Diagnosis:   Diagnosis   M25.571, G89.29, M25.572 (ICD-10-CM) - Chronic pain of both ankles       Rehab Codes: M25.471, M25.472, M25.572, M25.671, M25.672, R26.89, M62.81  Onset date: 2022  Next 's appt.: PRN    Subjective:   CC/HPI: Patient reports to physical therapy this date with (B) ankle effusion and ankle pain. Patient reports that she began to notice swelling within the (B) LE last summer with insidious onset. States that over the last month she has noticed an increase in swelling within the (B) LE, as well as pain in the dorsum of the foot that she would describe as stiffness and dull. States she mainly notices swelling after sitting or walking for long periods of time. States she has been utilizing a stand up desk for work and will sit for 4 hours and stand for 4 hours. Patient is also very active with walking and will walk 3 miles 4-5 days per week. Patient does state that the size of her ankles does reduce back to normal upon waking in the morning.  Notes that she has been wearing compression stocking that are helpful in reducing swelling for about 30 min after removing them. Patient was prescribed an increased dose of her water pill that was not helpful. X-rays taken showed moderate soft tissue swelling, however no other impairments. PMHx: [] Unremarkable [] Diabetes [x] HTN- controlled   [] Pacemaker   [] MI/Heart Problems  [] Cancer   [] Arthritis [] Other:              [x] Refer to full medical chart  In EPIC       Comorbidities:   [] Obesity [] Dialysis  [] N/A   [] Asthma/COPD [] Dementia [] Other   [] Stroke [] Sleep apnea [] Other:   [] Vascular disease [] Rheumatic disease [] Other:     Tests: [x] X-Ray: [] MRI:  [] Other:     Impression   Right ankle:       1. Moderate soft tissue swelling and edema of the right ankle. 2. No acute fracture or dislocation. Left ankle:       1. Moderate soft tissue swelling and edema of the left ankle. 2. Mild plantar calcaneal spur. No acute fracture or dislocation     Medications: [x] Refer to full medical record [] None [] Other:  Allergies:      [x] Refer to full medical record [] None [] Other:    Function:  Hand Dominance  [] Right  [] Left  Employer    Job Status [x]  Normal duty   [] Light duty   [] Off due to condition    []  Retired   [] Not employed   [] Disability  [] Other:  []  Return to work:    Work activities         ADL/IADL Previous level of function Current level of function Who currently assists the patient with task   Bathing  [x] Independent  [] Assist [x] Independent  [] Assist    Dress/grooming [x] Independent  [] Assist [x] Independent  [] Assist    Transfer/mobility [x] Independent  [] Assist [x] Independent  [] Assist Patient reports she has decreased walking for exercise    Feeding [x] Independent  [] Assist [x] Independent  [] Assist    Toileting [x] Independent  [] Assist [x] Independent  [] Assist    Driving [x] Independent  [] Assist [x] Independent  [] Assist    Housekeeping [x] Independent  [] Assist [x] Independent  [] Assist    Grocery shop/meal prep [x] Independent  [] Assist [x] Independent  [] Assist      Gait Prior level of function Current level of function    [x] Independent  [] Assist [x] Independent  [] Assist   Device: [x] Independent [x] Independent    [] Straight Cane [] Quad cane [] Straight Cane [] Quad cane    [] Standard walker [] Rolling walker   [] 4 wheeled walker [] Standard walker [] Rolling walker   [] 4 wheeled walker    [] Wheelchair [] Wheelchair     Pain:  [x] Yes  [] No Location: (B) ankle Pain Rating: (0-10 scale) 4-5/10  Pain altered Tx:  [] Yes  [] No  Action:    Symptoms:  [x] Improving [] Worsening [] Same  Better:  [] AM    [] PM    [] Sit    [] Rise/Sit    []Stand    [] Walk    [] Lying    [x] Other: Compression sock   Worse: [] AM    [] PM    [] Sit    [x] Rise/Sit    []Stand    [x] Walk    [] Lying    [] Bend                      [] Valsalva    [] Other:  Sleep: [x] OK    [] Disturbed    Objective:    ROM  ° A/P STRENGTH TESTS (+/-) Left Right Not Tested    Left Right Left Right Ant.  Drawer   []   Hip Flex   4 3+ Post. Drawer   []   Ext     Lachmans   []   ER     Valgus Stress   []   IR     Varus Stress   []   ABD     Keenas   []   ADD     Bounce Home   []   Knee Flex 125 125 5 4 Apleys Dist.   []   Ext -7 0 5 5 Hip Scouring   []   Ankle DF -5 0 5 5 ZACHARYs   []   PF 60 70 5 5 Piriformis   []   INV 35 40 4 4 Ezios   []   EVER 30 30 4 4 Talor Tilt   []        Pat-Fem Grind   []       OBSERVATION No Deficit Deficit Not Tested Comments   Posture       Forward Head [] [x] []    Rounded Shoulders [] [x] []    Iliac Crest [x] [] []    PSIS [x] [] []    ASIS [x] [] []    Genu Valgus [] [x] []    Genu Varus [x] [] []    Genu Recurvatum [x] [] []    Pronation [] [x] []    Supination [x] [] []    Palpation [] [x] [] No TTP    Sensation [x] [] []    Edema [] [x] [] Figure 8: (R) 57 cm, (L) 58 cm     Pitting edema present at (B) lateral and medial malleolus    Neurological [] [] [x]    Patellar Mobility [x] [] []    Patellar Orientation [x] [] []    Gait [] [x] [] Analysis: Patient ambulates with decreased arch, (B) foot pronation and decreased heel to toe contact          FUNCTION Normal Difficult Unable   Sitting [x] [] []   Standing [] [x] []   Ambulation [] [x] []   Groom/Dress [x] [] []   Lift/Carry [x] [] []   Stairs [] [x] []   Bending [x] [] []   Squat [] [] [x]   Kneel [] [] [x]     BALANCE/PROPRIOCEPTION              [] Not tested   Single leg stance       R                     L                                PAIN   Eyes open                      5   Sec.         7       Sec                  . [x]    Eyes closed                          Sec. Sec                  . []        Functional Test: LEFS Score: 23% functionally impaired     Comments:    Assessment:  Patient is a 58year old female who presents to physical therapy with (B) ankle swelling and foot pain. Patient demonstrates impairments in pain tolerance, (L) knee AROM, (L) ankle AROM, effusion, (B) LE strength, balance and gait mechanics. These impairments affect the patient's ability to complete ADLs, work related tasks, household related tasks without increased edema and pain. Patient would benefit from skilled physical therapy in order to improve impairments, improve overall quality of life and increase ease of working. Educated patient on evaluation findings and poc. Educated patient on utilizing compression stockings throughout the day in order to decrease edema within (B) ankles. Educated patient on completing ankle pumps throughout the day in order to decrease swelling and edema in ankles. Discussed following up with cardiologist due to water pill not decreasing swelling. Patient verbalized good understanding of all education at this time. Problems:    [x] ? Pain:  [x] ? ROM:  [x] ? Strength:  [x] ? Function:  [x] Other: Balance       STG: (to be met in 6 treatments)  ? Pain: Patient will report pain as 2/10   ?  ROM: Patient will improve (L) knee AROM to 0-125 deg in order to improve posterior chain mobility  Patient will improve (L) ankle AROM to equal (R) in order to improve gait mechanics   Patient will demonstrated reduce figure 8 measurements by 2 cm bilaterally in order to indicate reduced swelling within (B) ankles   Patient to be independent with home exercise program as demonstrated by performance with correct form without cues. LTG: (to be met in 12 treatments)  Patient will improve LEFS to 13% functionally impaired in order to indicate improved overall quality of life  Patient will be able to ambulate around clinic independently with reduced foot pronation and improve heel to toe contact  Patient will improve (B) LE strength to 5/5 in order to increase standing tolerance  Patient will improve (B) SLS to 15 sec in order to improve balance and stability within (B) LE  Patient will report pain as 0/10                   Patient goals: \"Decrease swelling and aching\"     Rehab Potential:  [x] Good  [] Fair  [] Poor   Suggested Professional Referral:  [x] No  [] Yes:  Barriers to Goal Achievement:  [x] No  [] Yes:  Domestic Concerns:  [x] No  [] Yes:    Pt. Education:  [x] Plans/Goals, Risks/Benefits discussed  [x] Home exercise program      Access Code: [email or web address]  URL: [email or web address]  Date: 02/14/2023  Prepared by: Ld Pascual    Exercises  Seated Hamstring Stretch - 2 x daily - 7 x weekly - 3 sets - 30 sec hold  Seated Calf Stretch with Strap - 2 x daily - 7 x weekly - 3 sets - 30 sec hold  Supine Ankle Pumps - 2 x daily - 7 x weekly - 2 sets - 10 reps  Supine Ankle Inversion Eversion AROM - 2 x daily - 7 x weekly - 2 sets - 10 reps  Seated Arch Lifts - 2 x daily - 7 x weekly - 2 sets - 10 reps  Seated Great Toe Extension - 2 x daily - 7 x weekly - 2 sets - 10 reps  Seated Lesser Toes Extension - 2 x daily - 7 x weekly - 2 sets - 10 reps  Heel Raises with Counter Support - 2 x daily - 7 x weekly - 2 sets - 10 reps    Method of Education: [x] Verbal  [x] Demo  [x] Written  Comprehension of Education:  [x] Verbalizes understanding. [x] Demonstrates understanding. [x] Needs Review. [] Demonstrates/verbalizes understanding of HEP/Ed previously given. Treatment Plan:  [x] Therapeutic Exercise   09192  [] Iontophoresis: 4 mg/mL Dexamethasone Sodium Phosphate  mAmin  26091   [x] Therapeutic Activity  06598 [x] Vasopneumatic cold with compression  27795    [] Gait Training   77750 [] Ultrasound   79318   [] Neuromuscular Re-education  47959 [] Electrical Stimulation Unattended  26447   [x] Manual Therapy  17693 [] Electrical Stimulation Attended  16958   [x] Instruction in HEP  [] Lumbar/Cervical Traction  57098   [] Aquatic Therapy   66525 [x] Cold/hotpack    [] Massage   73816      [] Dry Needling, 1 or 2 muscles  86543   [] Biofeedback, first 15 minutes   29395  [] Biofeedback, additional 15 minutes   57343 [] Dry Needling, 3 or more muscles  12871     [x]  Medication allergies reviewed for use of    Dexamethasone Sodium Phosphate 4mg/ml     with iontophoresis treatments. Pt is not allergic.     Frequency:  2 x/week for 12 visits        Todays Treatment:  Modalities:   Precautions:  Exercises:  Exercise Reps/ Time Weight/ Level Comments   Nustep             Supine Calf S      HS S            Ankle DF/PF x20     Ankle Inv/Ev x20           Foot doming  x20     Toe yoga x20           Bridges 2x10     Clamshells 2x10     SL hip abduction 2x10           Heel Raises  2x10     Other:    Specific Instructions for next treatment: Progress (B) ankle AROM, (L) knee AROM, (B) ankle strength, (B) hip strength       Evaluation Complexity:  History (Personal factors, comorbidities) [x] 0 [] 1-2 [] 3+   Exam (limitations, restrictions) [x] 1-2 [] 3 [] 4+   Clinical presentation (progression) [x] Stable [] Evolving  [] Unstable   Decision Making [x] Low [] Moderate [] High    [x] Low Complexity [] Moderate Complexity [] High Complexity       Treatment Charges: Mins Units   [x] Evaluation       [x]  Low       []  Moderate       []  High 27 1   []  Modalities     [x]  Ther Exercise 24 2   []  Manual Therapy     []  Ther Activities     []  Aquatics     []  Vasocompression     []  Other       TOTAL TREATMENT TIME: 51 min    Time in: 5:28 pm   Time Out: 6:25 pm    Electronically signed by: Santiago Moseley PT        Physician Signature:________________________________Date:__________________  By signing above or cosigning this note, I have reviewed this plan of care and certify a need for medically necessary rehabilitation services.      *PLEASE SIGN ABOVE AND FAX BACK ALL PAGES*

## 2023-02-20 ENCOUNTER — HOSPITAL ENCOUNTER (OUTPATIENT)
Dept: PHYSICAL THERAPY | Facility: CLINIC | Age: 63
Setting detail: THERAPIES SERIES
Discharge: HOME | End: 2023-02-20
Payer: COMMERCIAL

## 2023-02-20 PROCEDURE — 97110 THERAPEUTIC EXERCISES: CPT

## 2023-02-20 NOTE — FLOWSHEET NOTE
[] [facility]) [facility] &  Therapy  [address]  [phone]  F: [phone] [] [address]  [facility]   [address]  P: [phone]  F: [phone] [x] [place] &  Therapy  [address]  P: [phone]  F: [phone] [] [address]  P: [phone]  F: [phone] [] [address]  [facility]   [address]   [place]: [phone]  F: [phone]      Physical Therapy Daily Treatment Note    Date:  2023  Patient Name:  Nadiya Wang    :  1960  MRN: [id number]  Physician: NANCY Victoria                                    Insurance: MMO (visits BMN)  Medical Diagnosis:   Diagnosis   M25.571, G89.29, M25.572 (ICD-10-CM) - Chronic pain of both ankles                             Rehab Codes: M25.471, M25.472, M25.572, M25.671, M25.672, R26.89, M62.81  Onset date: 2022                Next 's appt.: PRN  Visit# / total visits:     Cancels/No Shows: 0/0    Subjective: pt arrives with c/o just continued bilateral ankle swelling at this time. States pain and loss of motion with prolonged sitting. Pain:  [x] Yes  [] No Location: L/R ankle  Pain Rating: (0-10 scale) 4-5/10  Pain altered Tx:  [x] No  [] Yes  Action:  Comments:    Objective:       Todays Treatment:  Modalities:   Precautions:  Exercises:  Exercise Reps/ Time Weight/ Level Comments   Nustep   6' L2                Wedge Calf S 3x30\"        HS S 3x30\"                  Ankle DF/PF x20       Ankle Inv/Ev x20                 Foot doming  x20       Toe yoga x20       Seated BAPS x20  L3      4 way ankle X20 ea lime          Bridges 2x10       Clamshells 2x10       SL hip abduction 2x10                 Heel Raises  2x10       Other:     Specific Instructions for next treatment: Progress (B) ankle AROM, (L) knee AROM, (B) ankle strength, (B) hip strength       Treatment Charges: Mins Units   []  Modalities     [x]  Ther Exercise 42 3   []  Manual Therapy     []  Ther Activities     []  Aquatics     []  Vasocompression     []  Other     Total Treatment time 42 3       Assessment: [x] Progressing toward goals. Warm up completed followed by stretches. Reviewed and completed intrinsic foot ex to improve stability and arch support. Challenged by toe yoga but able to complete. Added seated BAPS along with resisted 4 way ankle to further address issues. Cueing with 4 way ankle to improve eccentric control. Hip ex completed without issues. Pt denied any ankle pain with ex but edema still present, no change. [] No change. [] Other:  [x] Patient would continue to benefit from skilled physical therapy services in order to: improve impairments of L/R knee and L/R ankle, improve overall quality of life and increase ease of working. STG: (to be met in 6 treatments)  ? Pain: Patient will report pain as 2/10   ? ROM: Patient will improve (L) knee AROM to 0-125 deg in order to improve posterior chain mobility  Patient will improve (L) ankle AROM to equal (R) in order to improve gait mechanics   Patient will demonstrated reduce figure 8 measurements by 2 cm bilaterally in order to indicate reduced swelling within (B) ankles   Patient to be independent with home exercise program as demonstrated by performance with correct form without cues.   LTG: (to be met in 12 treatments)  Patient will improve LEFS to 13% functionally impaired in order to indicate improved overall quality of life  Patient will be able to ambulate around clinic independently with reduced foot pronation and improve heel to toe contact  Patient will improve (B) LE strength to 5/5 in order to increase standing tolerance  Patient will improve (B) SLS to 15 sec in order to improve balance and stability within (B) LE  Patient will report pain as 0/10                    Patient goals: \"Decrease swelling and aching\"     Pt. Education:  [x] Yes  [] No  [x] Reviewed Prior HEP/Ed  Method of Education: [x] Verbal  [x] Demo  [] Written  Comprehension of Education:  [x] Verbalizes understanding. [x] Demonstrates understanding. [] Needs review. [] Demonstrates/verbalizes HEP/Ed previously given. Access Code: [email or web address]  URL: [email or web address]  Date: 02/14/2023  Prepared by: Urban Perdue     Exercises  Seated Hamstring Stretch - 2 x daily - 7 x weekly - 3 sets - 30 sec hold  Seated Calf Stretch with Strap - 2 x daily - 7 x weekly - 3 sets - 30 sec hold  Supine Ankle Pumps - 2 x daily - 7 x weekly - 2 sets - 10 reps  Supine Ankle Inversion Eversion AROM - 2 x daily - 7 x weekly - 2 sets - 10 reps  Seated Arch Lifts - 2 x daily - 7 x weekly - 2 sets - 10 reps  Seated Great Toe Extension - 2 x daily - 7 x weekly - 2 sets - 10 reps  Seated Lesser Toes Extension - 2 x daily - 7 x weekly - 2 sets - 10 reps  Heel Raises with Counter Support - 2 x daily - 7 x weekly - 2 sets - 10 reps     Plan: [x] Continue current frequency toward long and short term goals.     [x] Specific Instructions for subsequent treatments: progress as lisbeth      Time In: 6:02 pm            Time Out: 6:46 pm    Electronically signed by:  Shayla Ventura PTA

## 2023-02-22 ENCOUNTER — HOSPITAL ENCOUNTER (OUTPATIENT)
Dept: PHYSICAL THERAPY | Facility: CLINIC | Age: 63
Setting detail: THERAPIES SERIES
Discharge: HOME | End: 2023-02-22
Payer: COMMERCIAL

## 2023-02-22 PROCEDURE — 97110 THERAPEUTIC EXERCISES: CPT

## 2023-02-22 NOTE — FLOWSHEET NOTE
[] [facility]  [address]  [phone]  F: [phone] [] [address]  [facility] 36   [address]  P: [phone]  F: [phone] [x] [place] [address]  Therapy  [address]  P: [phone]  F: [phone] [] [email or web address]  P: [phone]  F: [phone] [] [address]  [facility]   [address]   [place]: [phone]  F: [phone]      Physical Therapy Daily Treatment Note    Date:  2023  Patient Name:  Bernadette Vincent    :  1960  MRN: [id number]  Physician: NANCY Saba                                    Insurance: O (visits BMN)  Medical Diagnosis:   Diagnosis   M25.571, G89.29, M25.572 (ICD-10-CM) - Chronic pain of both ankles                             Rehab Codes: M25.471, M25.472, M25.572, M25.671, M25.672, R26.89, M62.81  Onset date: 2022                Next 's appt.: PRN  Visit# / total visits: 3/12    Cancels/No Shows: 0/0    Subjective:   Pain:  [x] Yes  [] No Location: L/R ankle  Pain Rating: (0-10 scale) 4-5/10  Pain altered Tx:  [x] No  [] Yes  Action:  Comments: Patient continues to report (B) ankle swelling that occurs later in the day. States that she attempted to contact her cardiologist with no response at this time. Objective:   Todays Treatment:  Modalities:   Precautions:  Exercises:  Exercise Reps/ Time Weight/ Level Comments   Nustep   6' L2                Wedge Calf S 3x30\"        HS S 3x30\"                  Ankle DF/PF x20       Ankle Inv/Ev x20                 Foot doming  x20       Toe yoga x20       Seated BAPS x20  L3   ant/post, S/S, CC, CCW   4 way ankle X20 ea lime          Bridges 2x10       Clamshells 2x10       SL hip abduction 2x10 Heel Raises  2x10       Eccentric HR 2x10     Figure 8 heel raises  2x10     Other:     Specific Instructions for next treatment: Progress (B) ankle AROM, (L) knee AROM, (B) ankle strength, (B) hip strength       Treatment Charges: Mins Units   []  Modalities     [x]  Ther Exercise 51 3   []  Manual Therapy     []  Ther Activities     []  Aquatics     []  Vasocompression     []  Other     Total Treatment time 51 3       Assessment: [x] Progressing toward goals. Began session this date with Nustep warm up followed with stretches in order to improve mobility within (B) LE. Continued with current poc per ex log. VC required to reduce knee compensation with BAPS this date. Initiated eccentric HR and figure 4 HR this date to progress gastroc strength and stability. VC required to improve activation of gastroc and reduce UE compensation. Patient demonstrates fair follow through. Patient leaves session stating no increase in pain, however no reduction in pain or swelling. [] No change. [] Other:  [x] Patient would continue to benefit from skilled physical therapy services in order to: improve impairments of L/R knee and L/R ankle, improve overall quality of life and increase ease of working. STG: (to be met in 6 treatments)  ? Pain: Patient will report pain as 2/10   ? ROM: Patient will improve (L) knee AROM to 0-125 deg in order to improve posterior chain mobility  Patient will improve (L) ankle AROM to equal (R) in order to improve gait mechanics   Patient will demonstrated reduce figure 8 measurements by 2 cm bilaterally in order to indicate reduced swelling within (B) ankles   Patient to be independent with home exercise program as demonstrated by performance with correct form without cues.   LTG: (to be met in 12 treatments)  Patient will improve LEFS to 13% functionally impaired in order to indicate improved overall quality of life  Patient will be able to ambulate around clinic independently with reduced foot pronation and improve heel to toe contact  Patient will improve (B) LE strength to 5/5 in order to increase standing tolerance  Patient will improve (B) SLS to 15 sec in order to improve balance and stability within (B) LE  Patient will report pain as 0/10                    Patient goals: \"Decrease swelling and aching\"     Pt. Education:  [x] Yes  [] No  [x] Reviewed Prior HEP/Ed  Method of Education: [x] Verbal  [x] Demo  [] Written  Comprehension of Education:  [x] Verbalizes understanding. [x] Demonstrates understanding. [] Needs review. [] Demonstrates/verbalizes HEP/Ed previously given. Access Code: [email or web address]  URL: [email or web address]  Date: 02/14/2023  Prepared by: Leo Ashford     Exercises  Seated Hamstring Stretch - 2 x daily - 7 x weekly - 3 sets - 30 sec hold  Seated Calf Stretch with Strap - 2 x daily - 7 x weekly - 3 sets - 30 sec hold  Supine Ankle Pumps - 2 x daily - 7 x weekly - 2 sets - 10 reps  Supine Ankle Inversion Eversion AROM - 2 x daily - 7 x weekly - 2 sets - 10 reps  Seated Arch Lifts - 2 x daily - 7 x weekly - 2 sets - 10 reps  Seated Great Toe Extension - 2 x daily - 7 x weekly - 2 sets - 10 reps  Seated Lesser Toes Extension - 2 x daily - 7 x weekly - 2 sets - 10 reps  Heel Raises with Counter Support - 2 x daily - 7 x weekly - 2 sets - 10 reps     Plan: [x] Continue current frequency toward long and short term goals.     [x] Specific Instructions for subsequent treatments: progress as lisbeth      Time In: 4:33 pm            Time Out: 5:24 pm    Electronically signed by:  Jaquan Giang, PT

## 2023-03-03 ENCOUNTER — CLINICAL DOCUMENTATION (OUTPATIENT)
Dept: PHYSICAL THERAPY | Facility: CLINIC | Age: 63
End: 2023-03-03

## 2023-03-16 ENCOUNTER — HOSPITAL ENCOUNTER (OUTPATIENT)
Age: 63
Discharge: HOME | End: 2023-03-16
Payer: COMMERCIAL

## 2023-03-16 LAB
ANION GAP SERPL CALCULATED.3IONS-SCNC: 8 MMOL/L (ref 9–17)
BUN SERPL-MCNC: 33 MG/DL (ref 8–23)
CALCIUM SERPL-MCNC: 8.9 MG/DL (ref 8.6–10.4)
CHLORIDE SERPL-SCNC: 106 MMOL/L (ref 98–107)
CO2 SERPL-SCNC: 29 MMOL/L (ref 20–31)
CREAT SERPL-MCNC: 1.02 MG/DL (ref 0.5–0.9)
GFR SERPL CREATININE-BSD FRML MDRD: >60 ML/MIN/1.73M2
GLUCOSE SERPL-MCNC: 99 MG/DL (ref 70–99)
POTASSIUM SERPL-SCNC: 3.3 MMOL/L (ref 3.7–5.3)
SODIUM SERPL-SCNC: 143 MMOL/L (ref 135–144)

## 2023-03-16 PROCEDURE — 80048 BASIC METABOLIC PNL TOTAL CA: CPT

## 2023-03-16 PROCEDURE — 36415 COLL VENOUS BLD VENIPUNCTURE: CPT

## 2023-05-15 ENCOUNTER — OFFICE VISIT (OUTPATIENT)
Dept: PRIMARY CARE CLINIC | Age: 63
End: 2023-05-15
Payer: COMMERCIAL

## 2023-05-15 VITALS
SYSTOLIC BLOOD PRESSURE: 128 MMHG | WEIGHT: 233.2 LBS | OXYGEN SATURATION: 98 % | HEIGHT: 70 IN | BODY MASS INDEX: 33.39 KG/M2 | HEART RATE: 61 BPM | DIASTOLIC BLOOD PRESSURE: 76 MMHG

## 2023-05-15 DIAGNOSIS — M79.89 LEG SWELLING: ICD-10-CM

## 2023-05-15 PROCEDURE — 3074F SYST BP LT 130 MM HG: CPT | Performed by: PHYSICIAN ASSISTANT

## 2023-05-15 PROCEDURE — 3017F COLORECTAL CA SCREEN DOC REV: CPT | Performed by: PHYSICIAN ASSISTANT

## 2023-05-15 PROCEDURE — G8417 CALC BMI ABV UP PARAM F/U: HCPCS | Performed by: PHYSICIAN ASSISTANT

## 2023-05-15 PROCEDURE — G8427 DOCREV CUR MEDS BY ELIG CLIN: HCPCS | Performed by: PHYSICIAN ASSISTANT

## 2023-05-15 PROCEDURE — 99213 OFFICE O/P EST LOW 20 MIN: CPT | Performed by: PHYSICIAN ASSISTANT

## 2023-05-15 PROCEDURE — 1036F TOBACCO NON-USER: CPT | Performed by: PHYSICIAN ASSISTANT

## 2023-05-15 PROCEDURE — 3078F DIAST BP <80 MM HG: CPT | Performed by: PHYSICIAN ASSISTANT

## 2023-05-15 RX ORDER — CARVEDILOL 3.12 MG/1
6.25 TABLET ORAL 2 TIMES DAILY WITH MEALS
Qty: 60 TABLET | Refills: 3 | Status: SHIPPED
Start: 2023-05-15

## 2023-05-15 RX ORDER — POTASSIUM CHLORIDE 20 MEQ/1
20 TABLET, EXTENDED RELEASE ORAL DAILY
Qty: 90 TABLET | Refills: 1 | Status: SHIPPED | OUTPATIENT
Start: 2023-05-15 | End: 2023-05-17 | Stop reason: SDUPTHER

## 2023-05-15 RX ORDER — FUROSEMIDE 40 MG/1
40 TABLET ORAL DAILY
Qty: 30 TABLET | Refills: 0 | Status: SHIPPED
Start: 2023-05-15

## 2023-05-15 ASSESSMENT — ENCOUNTER SYMPTOMS
SHORTNESS OF BREATH: 0
COLOR CHANGE: 0
ABDOMINAL PAIN: 0
CHEST TIGHTNESS: 0
APNEA: 0
COUGH: 0

## 2023-05-15 NOTE — PROGRESS NOTES
[address] [facility]  [address] [address] [facility] [address]  Dept: [phone]    Kelly Vo is a 61 y.o. female who presents today for her medical conditions/complaints as noted below. Chief Complaint   Patient presents with    Hypertension     Patient has not been checking BP. Leg Swelling     Swelling in both legs, more around ankles x 3 months     Other     Patient said she did not have Echo done but plans on scheduling this week       HPI:     Hypertension  Pertinent negatives include no chest pain, headaches, palpitations or shortness of breath. Other  Pertinent negatives include no abdominal pain, chest pain, coughing, fatigue, headaches or weakness.    Cardiology doubled her Lasix and Coreg  Still having swelling in legs    LDL Cholesterol (mg/dL)   Date Value   2021 120       (goal LDL is <100)   AST (U/L)   Date Value   10/15/2021 21     ALT (U/L)   Date Value   10/15/2021 23     BUN (mg/dL)   Date Value   2023 33 (H)     BP Readings from Last 3 Encounters:   05/15/23 128/76   23 126/78   23 124/72          (goal 120/80)    Past Medical History:   Diagnosis Date    Fatigue     Hypertension       Past Surgical History:   Procedure Laterality Date    CARDIAC CATHETERIZATION  2021    CATARACT REMOVAL      EYE SURGERY Bilateral     eye muscles as a child    OVARY REMOVAL      parital for cyst    TONSILLECTOMY         Family History   Problem Relation Age of Onset    Other Mother         preforation of bowel    Diabetes Mother     Cancer Father         kidney       Social History     Tobacco Use    Smoking status: Former     Packs/day: 1.00     Years: 30.00     Pack years: 30.00     Types: Cigarettes     Quit date: 10/29/2014     Years since quittin.5    Smokeless tobacco: Never   Substance Use Topics    Alcohol use: Yes     Comment: socially      Current Outpatient Medications   Medication Sig Dispense Refill

## 2023-05-17 DIAGNOSIS — M79.89 LEG SWELLING: ICD-10-CM

## 2023-05-17 RX ORDER — POTASSIUM CHLORIDE 20 MEQ/1
20 TABLET, EXTENDED RELEASE ORAL DAILY
Qty: 90 TABLET | Refills: 1 | Status: SHIPPED | OUTPATIENT
Start: 2023-05-17

## 2023-05-24 ENCOUNTER — HOSPITAL ENCOUNTER (OUTPATIENT)
Dept: NON INVASIVE DIAGNOSTICS | Age: 63
Discharge: HOME | End: 2023-05-24
Payer: COMMERCIAL

## 2023-05-24 DIAGNOSIS — M79.89 LEG SWELLING: ICD-10-CM

## 2023-05-24 DIAGNOSIS — I10 ESSENTIAL HYPERTENSION: ICD-10-CM

## 2023-05-24 LAB
LV EF: 55 %
LVEF MODALITY: NORMAL

## 2023-05-24 PROCEDURE — 93306 TTE W/DOPPLER COMPLETE: CPT

## 2023-08-02 ENCOUNTER — TELEPHONE (OUTPATIENT)
Dept: PRIMARY CARE CLINIC | Age: 63
End: 2023-08-02

## 2023-08-02 NOTE — TELEPHONE ENCOUNTER
I think this is the patient that you kindly looked into for me in the past.  I do not know how to go about doing this. Can you help?

## 2023-08-02 NOTE — TELEPHONE ENCOUNTER
Pt got a bill for the 2nd part of her sleep study for 10,0000 dollars and will be going to collections soon. It was resubmitted with same code used on the 1st part of S.S.  and this is a wrong code for 2nd part of test.  Needs to be re-submiited by provider, per pt's ins co. Needs to have this code on it   Also has to state that this is a corrected claim, or it will get rejected. Pt called billing and they told her that this is for the office.   Pt would like a call back after it has been submitted please @ [phone]

## 2023-08-03 ENCOUNTER — TELEPHONE (OUTPATIENT)
Dept: PRIMARY CARE CLINIC | Age: 63
End: 2023-08-03

## 2023-08-03 NOTE — TELEPHONE ENCOUNTER
----- Message from Deana sent at 7/31/2023 10:16 AM EDT -----  Subject: Message to Provider    QUESTIONS  Information for Provider? Patient called to speak to someone regarding   bill resubmission of rosario. [phone] ext [phone]  ---------------------------------------------------------------------------  --------------  Soni MELISSA  [phone]; OK to leave message on voicemail  ---------------------------------------------------------------------------  --------------  SCRIPT ANSWERS  Relationship to Patient?  Self

## 2023-08-03 NOTE — TELEPHONE ENCOUNTER
Please advise Lizbet Villar that the sleep center talked to billing again to have this resubmitted with correct codes.

## 2023-11-03 ENCOUNTER — OFFICE VISIT (OUTPATIENT)
Dept: PRIMARY CARE CLINIC | Age: 63
End: 2023-11-03
Payer: COMMERCIAL

## 2023-11-03 VITALS
SYSTOLIC BLOOD PRESSURE: 118 MMHG | OXYGEN SATURATION: 97 % | DIASTOLIC BLOOD PRESSURE: 80 MMHG | BODY MASS INDEX: 34.79 KG/M2 | HEART RATE: 60 BPM | WEIGHT: 243 LBS | HEIGHT: 70 IN

## 2023-11-03 DIAGNOSIS — M79.89 LEG SWELLING: ICD-10-CM

## 2023-11-03 DIAGNOSIS — R21 RASH: Primary | ICD-10-CM

## 2023-11-03 DIAGNOSIS — Z23 NEED FOR VACCINATION: ICD-10-CM

## 2023-11-03 PROCEDURE — 90471 IMMUNIZATION ADMIN: CPT | Performed by: PHYSICIAN ASSISTANT

## 2023-11-03 PROCEDURE — G8482 FLU IMMUNIZE ORDER/ADMIN: HCPCS | Performed by: PHYSICIAN ASSISTANT

## 2023-11-03 PROCEDURE — 3017F COLORECTAL CA SCREEN DOC REV: CPT | Performed by: PHYSICIAN ASSISTANT

## 2023-11-03 PROCEDURE — G8427 DOCREV CUR MEDS BY ELIG CLIN: HCPCS | Performed by: PHYSICIAN ASSISTANT

## 2023-11-03 PROCEDURE — 90674 CCIIV4 VAC NO PRSV 0.5 ML IM: CPT | Performed by: PHYSICIAN ASSISTANT

## 2023-11-03 PROCEDURE — 3074F SYST BP LT 130 MM HG: CPT | Performed by: PHYSICIAN ASSISTANT

## 2023-11-03 PROCEDURE — 99213 OFFICE O/P EST LOW 20 MIN: CPT | Performed by: PHYSICIAN ASSISTANT

## 2023-11-03 PROCEDURE — 3079F DIAST BP 80-89 MM HG: CPT | Performed by: PHYSICIAN ASSISTANT

## 2023-11-03 PROCEDURE — 1036F TOBACCO NON-USER: CPT | Performed by: PHYSICIAN ASSISTANT

## 2023-11-03 PROCEDURE — G8417 CALC BMI ABV UP PARAM F/U: HCPCS | Performed by: PHYSICIAN ASSISTANT

## 2023-11-03 RX ORDER — POTASSIUM CHLORIDE 20 MEQ/1
20 TABLET, EXTENDED RELEASE ORAL DAILY
Qty: 30 TABLET | Refills: 0 | Status: SHIPPED | OUTPATIENT
Start: 2023-11-03

## 2023-11-03 RX ORDER — KETOCONAZOLE 20 MG/G
CREAM TOPICAL
Qty: 30 G | Refills: 0 | Status: SHIPPED | OUTPATIENT
Start: 2023-11-03

## 2023-11-03 ASSESSMENT — ENCOUNTER SYMPTOMS
ABDOMINAL PAIN: 0
EYES NEGATIVE: 1
RESPIRATORY NEGATIVE: 1
COLOR CHANGE: 0

## 2023-11-03 NOTE — PROGRESS NOTES
[facility]  [address]   Phone: [phone]  Fax: [phone]    Marcia Harris is a 61 y.o. female who presents today for her medical conditions/complaintsas noted below. Chief Complaint   Patient presents with    Skin Problem     Pt has a spot on r breast that is spreading and itching , been using triamcinolone for x3 days         HPI:     HPI  Started as a very small spot and has gotten larger over last 3 days  Itchy and bleeds only with scratching. No new exposures to skin  Has tried Triamcinolone cream and that has not helped. No family history of breast cancer. It is not painful, not does she feel any lumps in breast or axillae      Current Outpatient Medications   Medication Sig Dispense Refill    ketoconazole (NIZORAL) 2 % cream Apply topically daily. 30 g 0    potassium chloride (KLOR-CON M) 20 MEQ extended release tablet Take 1 tablet by mouth daily 30 tablet 0    carvedilol (COREG) 3.125 MG tablet take 1 tablet by mouth twice a day 180 tablet 1    furosemide (LASIX) 40 MG tablet Take 1 tablet by mouth daily 30 tablet 0    triamcinolone (ARISTOCORT) 0.5 % cream Apply topically 3 times daily. 45 g 0    amLODIPine (NORVASC) 5 MG tablet take 1 tablet by mouth once daily      vitamin D (CHOLECALCIFEROL) 125 MCG (5000 UT) CAPS capsule Take 1 capsule by mouth daily       No current facility-administered medications for this visit. No Known Allergies    Subjective:      Review of Systems   Constitutional:  Negative for chills, diaphoresis, fever and unexpected weight change. HENT: Negative. Negative for mouth sores. Eyes: Negative. Respiratory: Negative. Cardiovascular: Negative. Gastrointestinal:  Negative for abdominal pain. Musculoskeletal:  Negative for arthralgias and myalgias. Skin:  Positive for rash. Negative for color change, pallor and wound.    Allergic/Immunologic: Negative for environmental allergies,

## 2023-11-09 ENCOUNTER — OFFICE VISIT (OUTPATIENT)
Dept: PRIMARY CARE CLINIC | Age: 63
End: 2023-11-09
Payer: COMMERCIAL

## 2023-11-09 ENCOUNTER — HOSPITAL ENCOUNTER (OUTPATIENT)
Age: 63
Setting detail: SPECIMEN
Discharge: HOME | End: 2023-11-09

## 2023-11-09 VITALS
DIASTOLIC BLOOD PRESSURE: 80 MMHG | SYSTOLIC BLOOD PRESSURE: 120 MMHG | WEIGHT: 241 LBS | HEIGHT: 70 IN | BODY MASS INDEX: 34.5 KG/M2 | HEART RATE: 67 BPM | OXYGEN SATURATION: 96 %

## 2023-11-09 DIAGNOSIS — Z12.11 COLON CANCER SCREENING: ICD-10-CM

## 2023-11-09 DIAGNOSIS — Z12.31 ENCOUNTER FOR SCREENING MAMMOGRAM FOR BREAST CANCER: ICD-10-CM

## 2023-11-09 DIAGNOSIS — R21 RASH: ICD-10-CM

## 2023-11-09 DIAGNOSIS — Z12.4 CERVICAL CANCER SCREENING: ICD-10-CM

## 2023-11-09 DIAGNOSIS — T14.8XXA OPEN WOUND OF SKIN: ICD-10-CM

## 2023-11-09 DIAGNOSIS — N95.2 ATROPHIC VAGINITIS: ICD-10-CM

## 2023-11-09 DIAGNOSIS — Z01.419 ENCOUNTER FOR GYNECOLOGICAL EXAMINATION: Primary | ICD-10-CM

## 2023-11-09 PROCEDURE — 99396 PREV VISIT EST AGE 40-64: CPT | Performed by: PHYSICIAN ASSISTANT

## 2023-11-09 PROCEDURE — 3074F SYST BP LT 130 MM HG: CPT | Performed by: PHYSICIAN ASSISTANT

## 2023-11-09 PROCEDURE — 3079F DIAST BP 80-89 MM HG: CPT | Performed by: PHYSICIAN ASSISTANT

## 2023-11-09 PROCEDURE — G8482 FLU IMMUNIZE ORDER/ADMIN: HCPCS | Performed by: PHYSICIAN ASSISTANT

## 2023-11-09 RX ORDER — ESTRADIOL 10 UG/1
10 INSERT VAGINAL
Qty: 8 TABLET | Refills: 5 | Status: SHIPPED | OUTPATIENT
Start: 2023-11-09

## 2023-11-09 NOTE — PROGRESS NOTES
[facility]  [address]   Phone: [phone]  Fax: [phone]    Ti Mercedes is a 61 y.o. female who presents today for her medicalconditions/complaints as noted below. Chief Complaint   Patient presents with    Gynecologic Exam     Pt is here for a pap, pt cant remember her last pap, pt states rash is a little better on breast but feels like it is spreading to stomach         No Known Allergies  Current Outpatient Medications   Medication Sig Dispense Refill    mupirocin (BACTROBAN) 2 % ointment Apply topically 3 times daily. 22 g 0    Estradiol (VAGIFEM) 10 MCG TABS vaginal tablet Place 1 tablet vaginally Twice a Week 8 tablet 5    ketoconazole (NIZORAL) 2 % cream Apply topically daily. 30 g 0    potassium chloride (KLOR-CON M) 20 MEQ extended release tablet Take 1 tablet by mouth daily 30 tablet 0    carvedilol (COREG) 3.125 MG tablet take 1 tablet by mouth twice a day 180 tablet 1    furosemide (LASIX) 40 MG tablet Take 1 tablet by mouth daily 30 tablet 0    triamcinolone (ARISTOCORT) 0.5 % cream Apply topically 3 times daily. 45 g 0    amLODIPine (NORVASC) 5 MG tablet take 1 tablet by mouth once daily      vitamin D (CHOLECALCIFEROL) 125 MCG (5000 UT) CAPS capsule Take 1 capsule by mouth daily       No current facility-administered medications for this visit.      Past Medical History:   Diagnosis Date    Fatigue     Hypertension      Social History     Socioeconomic History    Marital status:      Spouse name: Not on file    Number of children: Not on file    Years of education: Not on file    Highest education level: Not on file   Occupational History    Occupation:    Tobacco Use    Smoking status: Former     Packs/day: 1.00     Years: 30.00     Additional pack years: 0.00     Total pack years: 30.00     Types: Cigarettes     Quit date: 10/29/2014     Years since quittin.0    Smokeless tobacco: Never   Vaping Use

## 2023-11-10 DIAGNOSIS — M79.89 LEG SWELLING: ICD-10-CM

## 2023-11-10 RX ORDER — POTASSIUM CHLORIDE 20 MEQ/1
20 TABLET, EXTENDED RELEASE ORAL DAILY
Qty: 90 TABLET | OUTPATIENT
Start: 2023-11-10

## 2023-11-13 LAB
HPV I/H RISK 4 DNA CVX QL NAA+PROBE: NOT DETECTED
HPV SAMPLE: NORMAL
HPV, INTERPRETATION: NORMAL
HPV16 DNA CVX QL NAA+PROBE: NOT DETECTED
HPV18 DNA CVX QL NAA+PROBE: NOT DETECTED
SPECIMEN DESCRIPTION: NORMAL

## 2023-11-15 LAB — CYTOLOGY REPORT: NORMAL

## 2023-12-02 DIAGNOSIS — M79.89 LEG SWELLING: ICD-10-CM

## 2023-12-04 RX ORDER — POTASSIUM CHLORIDE 20 MEQ/1
20 TABLET, EXTENDED RELEASE ORAL DAILY
Qty: 30 TABLET | Refills: 3 | Status: SHIPPED | OUTPATIENT
Start: 2023-12-04

## 2024-01-09 NOTE — PROGRESS NOTES
Patient admitted, consent signed and questions answered. Patient ready for procedure. Call light to reach with side rails up 2 of 2. bilat groin hair clipped. family at bedside with patient.   History and physical completed Received message from Dr. GARCIA - Eliquis Rx sent to pt's pharmacy - requesting prior auth be completed.    SW spoke to [facility]/ pt's [facility] at: [phone] and confirmed prior auth required.    SW completed prior auth request on CoverMyMeds.      Sterling Thomas (Brand: [id number]) has been approved for Eliquis Rx.    Dr. GARCIA has been updated.        Sharla Espinoza, MSW, LSW [id number]

## 2024-03-05 ENCOUNTER — OFFICE VISIT (OUTPATIENT)
Dept: PRIMARY CARE CLINIC | Age: 64
End: 2024-03-05
Payer: COMMERCIAL

## 2024-03-05 VITALS
DIASTOLIC BLOOD PRESSURE: 68 MMHG | BODY MASS INDEX: 35.59 KG/M2 | WEIGHT: 248.6 LBS | HEART RATE: 59 BPM | SYSTOLIC BLOOD PRESSURE: 108 MMHG | HEIGHT: 70 IN | OXYGEN SATURATION: 97 %

## 2024-03-05 DIAGNOSIS — S39.012A LUMBAR STRAIN, INITIAL ENCOUNTER: Primary | ICD-10-CM

## 2024-03-05 PROCEDURE — 3078F DIAST BP <80 MM HG: CPT | Performed by: PHYSICIAN ASSISTANT

## 2024-03-05 PROCEDURE — 99213 OFFICE O/P EST LOW 20 MIN: CPT | Performed by: PHYSICIAN ASSISTANT

## 2024-03-05 PROCEDURE — G8482 FLU IMMUNIZE ORDER/ADMIN: HCPCS | Performed by: PHYSICIAN ASSISTANT

## 2024-03-05 PROCEDURE — 1036F TOBACCO NON-USER: CPT | Performed by: PHYSICIAN ASSISTANT

## 2024-03-05 PROCEDURE — 3074F SYST BP LT 130 MM HG: CPT | Performed by: PHYSICIAN ASSISTANT

## 2024-03-05 PROCEDURE — 3017F COLORECTAL CA SCREEN DOC REV: CPT | Performed by: PHYSICIAN ASSISTANT

## 2024-03-05 PROCEDURE — G8417 CALC BMI ABV UP PARAM F/U: HCPCS | Performed by: PHYSICIAN ASSISTANT

## 2024-03-05 PROCEDURE — G8427 DOCREV CUR MEDS BY ELIG CLIN: HCPCS | Performed by: PHYSICIAN ASSISTANT

## 2024-03-05 RX ORDER — CYCLOBENZAPRINE HCL 5 MG
5 TABLET ORAL 3 TIMES DAILY PRN
Qty: 30 TABLET | Refills: 0 | Status: SHIPPED | OUTPATIENT
Start: 2024-03-05 | End: 2024-03-15

## 2024-03-05 RX ORDER — NAPROXEN 500 MG/1
500 TABLET ORAL 2 TIMES DAILY WITH MEALS
Qty: 60 TABLET | Refills: 0 | Status: SHIPPED | OUTPATIENT
Start: 2024-03-05

## 2024-03-05 ASSESSMENT — ENCOUNTER SYMPTOMS
BACK PAIN: 1
RESPIRATORY NEGATIVE: 1

## 2024-03-05 NOTE — PROGRESS NOTES
[facility]  [address] [address]  [facility] [address]  Phone: [phone]  Fax: [phone]    Nicolette Rodriguez is a 63 y.o. female who presents today for her medical conditions/complaintsas noted below.  Chief Complaint   Patient presents with    Back Pain     Patient is here today for lower back pain which started 3 days ago. Patient had been doing laundry and thinks she pulled a muscle. Patient has tried motrin, heat and ice, nothing has helped with discomfort.        HPI:     HPI  Twisted and lifted laundryand felt pain immediately   She has trouble putting pants on, can not cross legs, bending  Ice helps more and tried Tylenol and Ibuprofen.     Current Outpatient Medications   Medication Sig Dispense Refill    cyclobenzaprine (FLEXERIL) 5 MG tablet Take 1 tablet by mouth 3 times daily as needed for Muscle spasms 30 tablet 0    naproxen (NAPROSYN) 500 MG tablet Take 1 tablet by mouth 2 times daily (with meals) 60 tablet 0    amLODIPine (NORVASC) 5 MG tablet take 1 tablet by mouth once daily 30 tablet 2    carvedilol (COREG) 3.125 MG tablet Take 1 tablet by mouth 2 times daily 180 tablet 1    furosemide (LASIX) 20 MG tablet Take 1 tablet by mouth daily 90 tablet 1    potassium chloride (KLOR-CON M) 20 MEQ extended release tablet take 1 tablet by mouth once daily 30 tablet 3    mupirocin (BACTROBAN) 2 % ointment Apply topically 3 times daily. 22 g 0    Estradiol (VAGIFEM) 10 MCG TABS vaginal tablet Place 1 tablet vaginally Twice a Week 8 tablet 5    ketoconazole (NIZORAL) 2 % cream Apply topically daily. 30 g 0    triamcinolone (ARISTOCORT) 0.5 % cream Apply topically 3 times daily. 45 g 0    vitamin D (CHOLECALCIFEROL) 125 MCG (5000 UT) CAPS capsule Take 1 capsule by mouth daily       No current facility-administered medications for this visit.     No Known Allergies    Subjective:      Review of Systems   Constitutional:  Negative for chills, diaphoresis and fever.

## 2024-04-06 DIAGNOSIS — M79.89 LEG SWELLING: ICD-10-CM

## 2024-04-08 RX ORDER — POTASSIUM CHLORIDE 20 MEQ/1
20 TABLET, EXTENDED RELEASE ORAL DAILY
Qty: 30 TABLET | Refills: 3 | Status: SHIPPED | OUTPATIENT
Start: 2024-04-08

## 2024-10-17 DIAGNOSIS — M79.89 LEG SWELLING: ICD-10-CM

## 2024-10-17 RX ORDER — POTASSIUM CHLORIDE 1500 MG/1
20 TABLET, EXTENDED RELEASE ORAL DAILY
Qty: 30 TABLET | Refills: 3 | Status: SHIPPED | OUTPATIENT
Start: 2024-10-17

## 2024-12-23 ENCOUNTER — HOSPITAL ENCOUNTER (OUTPATIENT)
Age: 64
Discharge: HOME | End: 2024-12-25
Payer: COMMERCIAL

## 2024-12-23 ENCOUNTER — OFFICE VISIT (OUTPATIENT)
Dept: PRIMARY CARE CLINIC | Age: 64
End: 2024-12-23

## 2024-12-23 ENCOUNTER — HOSPITAL ENCOUNTER (OUTPATIENT)
Dept: GENERAL RADIOLOGY | Age: 64
Discharge: HOME | End: 2024-12-25
Payer: COMMERCIAL

## 2024-12-23 VITALS
SYSTOLIC BLOOD PRESSURE: 130 MMHG | OXYGEN SATURATION: 96 % | BODY MASS INDEX: 35.07 KG/M2 | WEIGHT: 245 LBS | HEIGHT: 70 IN | TEMPERATURE: 97.2 F | DIASTOLIC BLOOD PRESSURE: 82 MMHG | HEART RATE: 57 BPM

## 2024-12-23 DIAGNOSIS — R10.32 LEFT LOWER QUADRANT PAIN: Primary | ICD-10-CM

## 2024-12-23 DIAGNOSIS — R19.8 CHANGE IN BOWEL FUNCTION: ICD-10-CM

## 2024-12-23 DIAGNOSIS — R10.32 LLQ ABDOMINAL PAIN: ICD-10-CM

## 2024-12-23 DIAGNOSIS — Z23 NEED FOR VACCINATION: ICD-10-CM

## 2024-12-23 LAB
BILIRUBIN, POC: NEGATIVE
BLOOD URINE, POC: NORMAL
CLARITY, POC: NORMAL
COLOR, POC: NORMAL
GLUCOSE URINE, POC: NEGATIVE MG/DL
KETONES, POC: NEGATIVE MG/DL
LEUKOCYTE EST, POC: NORMAL
NITRITE, POC: NEGATIVE
PH, POC: 5.5
PROTEIN, POC: NEGATIVE MG/DL
SPECIFIC GRAVITY, POC: 1.01
UROBILINOGEN, POC: 0.2 MG/DL

## 2024-12-23 PROCEDURE — 74019 RADEX ABDOMEN 2 VIEWS: CPT

## 2024-12-23 SDOH — ECONOMIC STABILITY: FOOD INSECURITY: WITHIN THE PAST 12 MONTHS, YOU WORRIED THAT YOUR FOOD WOULD RUN OUT BEFORE YOU GOT MONEY TO BUY MORE.: NEVER TRUE

## 2024-12-23 SDOH — ECONOMIC STABILITY: FOOD INSECURITY: WITHIN THE PAST 12 MONTHS, THE FOOD YOU BOUGHT JUST DIDN'T LAST AND YOU DIDN'T HAVE MONEY TO GET MORE.: NEVER TRUE

## 2024-12-23 SDOH — ECONOMIC STABILITY: INCOME INSECURITY: HOW HARD IS IT FOR YOU TO PAY FOR THE VERY BASICS LIKE FOOD, HOUSING, MEDICAL CARE, AND HEATING?: NOT HARD AT ALL

## 2024-12-23 ASSESSMENT — ENCOUNTER SYMPTOMS
NAUSEA: 0
SHORTNESS OF BREATH: 0
CONSTIPATION: 1
ABDOMINAL PAIN: 1
VOMITING: 0

## 2024-12-23 ASSESSMENT — PATIENT HEALTH QUESTIONNAIRE - PHQ9
2. FEELING DOWN, DEPRESSED OR HOPELESS: NOT AT ALL
1. LITTLE INTEREST OR PLEASURE IN DOING THINGS: NOT AT ALL
SUM OF ALL RESPONSES TO PHQ QUESTIONS 1-9: 0
SUM OF ALL RESPONSES TO PHQ9 QUESTIONS 1 & 2: 0
SUM OF ALL RESPONSES TO PHQ QUESTIONS 1-9: 0

## 2024-12-23 NOTE — PROGRESS NOTES
Negative for chills, diaphoresis and fever.   Respiratory:  Negative for shortness of breath.    Cardiovascular:  Negative for chest pain.   Gastrointestinal:  Positive for abdominal pain and constipation. Negative for nausea and vomiting.   Genitourinary:  Positive for pelvic pain. Negative for difficulty urinating, flank pain, hematuria, vaginal bleeding, vaginal discharge and vaginal pain.        Little darker    Skin:  Negative for rash.       Objective:     /82   Pulse 57   Temp 97.2 °F (36.2 °C)   Ht 1.778 m (5' 10\")   Wt 111.1 kg (245 lb)   SpO2 96%   BMI 35.15 kg/m²   Physical Exam  Vitals and nursing note reviewed.   Constitutional:       Appearance: Normal appearance. She is not ill-appearing.   Cardiovascular:      Rate and Rhythm: Normal rate and regular rhythm.      Heart sounds: Normal heart sounds.   Pulmonary:      Effort: Pulmonary effort is normal.      Breath sounds: Normal breath sounds. No wheezing, rhonchi or rales.   Abdominal:      General: Bowel sounds are normal. There is no distension.      Palpations: There is no hepatomegaly, splenomegaly or mass.      Tenderness: There is abdominal tenderness. There is no guarding or rebound.      Hernia: No hernia is present.   Neurological:      Mental Status: She is alert.         Assessment/Plan:   1. Left lower quadrant pain  -     POCT Urinalysis No Micro (Auto)  2. Need for vaccination  -     Influenza, FLUCELVAX Trivalent, (age 6 mo+) IM, Preservative Free, 0.5mL  3. LLQ abdominal pain  -     XR ABDOMEN (2 VIEWS); Future  4. Change in bowel function  -     XR ABDOMEN (2 VIEWS); Future   Likely constipation  Urine is normal  Have abdominal x-ray today --if not constipated then will do a CT scan   Take Miralax daily until a normal BM  Ibuprofen or Tylenol for pain  No follow-ups on file.    Electronically signed by Verónica Mendoza 12/23/2024 at 1:56 PM

## 2024-12-26 DIAGNOSIS — R10.32 LLQ ABDOMINAL PAIN: Primary | ICD-10-CM

## 2025-04-02 ENCOUNTER — TELEPHONE (OUTPATIENT)
Dept: PRIMARY CARE CLINIC | Age: 65
End: 2025-04-02

## 2025-04-02 NOTE — TELEPHONE ENCOUNTER
Patient called and lmom to ask if she still wants to get the US renal done ordered by Dr. West. If so please call [phone].

## 2025-04-28 DIAGNOSIS — M79.89 LEG SWELLING: ICD-10-CM

## 2025-04-28 RX ORDER — POTASSIUM CHLORIDE 1500 MG/1
20 TABLET, EXTENDED RELEASE ORAL DAILY
Qty: 30 TABLET | Refills: 5 | Status: SHIPPED | OUTPATIENT
Start: 2025-04-28

## 2025-06-20 ENCOUNTER — OFFICE VISIT (OUTPATIENT)
Dept: PRIMARY CARE CLINIC | Age: 65
End: 2025-06-20

## 2025-06-20 VITALS
HEIGHT: 70 IN | SYSTOLIC BLOOD PRESSURE: 122 MMHG | WEIGHT: 243.8 LBS | HEART RATE: 59 BPM | DIASTOLIC BLOOD PRESSURE: 76 MMHG | BODY MASS INDEX: 34.9 KG/M2 | OXYGEN SATURATION: 94 %

## 2025-06-20 DIAGNOSIS — M70.61 GREATER TROCHANTERIC BURSITIS OF RIGHT HIP: Primary | ICD-10-CM

## 2025-06-20 DIAGNOSIS — M70.61 GREATER TROCHANTERIC BURSITIS OF RIGHT HIP: ICD-10-CM

## 2025-06-20 LAB
ANION GAP SERPL CALCULATED.3IONS-SCNC: 12 MMOL/L (ref 9–16)
BUN BLDV-MCNC: 26 MG/DL (ref 8–23)
CALCIUM SERPL-MCNC: 10 MG/DL (ref 8.6–10.4)
CHLORIDE BLD-SCNC: 104 MMOL/L (ref 98–107)
CO2: 25 MMOL/L (ref 20–31)
CREAT SERPL-MCNC: 1.1 MG/DL (ref 0.6–0.9)
GFR, ESTIMATED: 56 ML/MIN/1.73M2
GLUCOSE BLD-MCNC: 97 MG/DL (ref 74–99)
POTASSIUM SERPL-SCNC: 4.8 MMOL/L (ref 3.7–5.3)
SODIUM BLD-SCNC: 141 MMOL/L (ref 136–145)

## 2025-06-20 PROCEDURE — 99213 OFFICE O/P EST LOW 20 MIN: CPT | Performed by: PHYSICIAN ASSISTANT

## 2025-06-20 PROCEDURE — 1123F ACP DISCUSS/DSCN MKR DOCD: CPT | Performed by: PHYSICIAN ASSISTANT

## 2025-06-20 PROCEDURE — 3074F SYST BP LT 130 MM HG: CPT | Performed by: PHYSICIAN ASSISTANT

## 2025-06-20 PROCEDURE — 3078F DIAST BP <80 MM HG: CPT | Performed by: PHYSICIAN ASSISTANT

## 2025-06-20 RX ORDER — MELOXICAM 7.5 MG/1
7.5 TABLET ORAL DAILY
Qty: 30 TABLET | Refills: 1 | Status: SHIPPED | OUTPATIENT
Start: 2025-06-20

## 2025-06-20 SDOH — ECONOMIC STABILITY: FOOD INSECURITY: WITHIN THE PAST 12 MONTHS, THE FOOD YOU BOUGHT JUST DIDN'T LAST AND YOU DIDN'T HAVE MONEY TO GET MORE.: NEVER TRUE

## 2025-06-20 SDOH — ECONOMIC STABILITY: FOOD INSECURITY: WITHIN THE PAST 12 MONTHS, YOU WORRIED THAT YOUR FOOD WOULD RUN OUT BEFORE YOU GOT MONEY TO BUY MORE.: NEVER TRUE

## 2025-06-20 ASSESSMENT — PATIENT HEALTH QUESTIONNAIRE - PHQ9
SUM OF ALL RESPONSES TO PHQ QUESTIONS 1-9: 0
1. LITTLE INTEREST OR PLEASURE IN DOING THINGS: NOT AT ALL
SUM OF ALL RESPONSES TO PHQ QUESTIONS 1-9: 0
2. FEELING DOWN, DEPRESSED OR HOPELESS: NOT AT ALL

## 2025-06-20 NOTE — PROGRESS NOTES
[facility]  [address] [address] [facility] [address]  Phone: [phone]  Fax: [phone]    Nicolette Rodriguez is a 65 y.o. female who presents today for her medical conditions/complaints as noted below.     Hip Pain (Right sided x6 months)      HPI:     Patient presents to the office for evaluation of right hip pain on the outside of the hip.   Has started bothering her more over the last 6 months, she retired and has been more active.  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  She walks 3 to 5 miles per day.  Hurts if she flexes her hip. Hurts when she sleeps directly on that side.   No groin pain, no back pain, no redness or swelling of the hip.  She has been taking extra strength tylenol.  She is not taking any OTC NSAIDs, she does not take the naproxen that is on her medication list.    Current Outpatient Medications   Medication Sig Dispense Refill    meloxicam (MOBIC) 7.5 MG tablet Take 1 tablet by mouth daily 30 tablet 1    potassium chloride (KLOR-CON M) 20 MEQ extended release tablet TAKE 1 TABLET BY MOUTH EVERY DAY 30 tablet 5    carvedilol (COREG) 3.125 MG tablet Take 1 tablet by mouth 2 times daily 180 tablet 1    furosemide (LASIX) 20 MG tablet Take 1 tablet by mouth daily 90 tablet 3    amLODIPine (NORVASC) 5 MG tablet Take 1 tablet by mouth daily 90 tablet 3    Estradiol (VAGIFEM) 10 MCG TABS vaginal tablet Place 1 tablet vaginally Twice a Week (Patient taking differently: Place 1 tablet vaginally as needed) 8 tablet 5    vitamin D (CHOLECALCIFEROL) 125 MCG (5000 UT) CAPS capsule Take 1 capsule by mouth daily      mupirocin (BACTROBAN) 2 % ointment Apply topically 3 times daily. (Patient not taking: Reported on 6/20/2025) 22 g 0    ketoconazole (NIZORAL) 2 % cream Apply topically daily. (Patient not taking: Reported on 6/20/2025) 30 g 0    triamcinolone (ARISTOCORT) 0.5 % cream Apply topically 3 times daily. (Patient not taking: Reported on 6/20/2025) 45 g 0     No current

## 2025-06-23 ENCOUNTER — RESULTS FOLLOW-UP (OUTPATIENT)
Dept: PRIMARY CARE CLINIC | Age: 65
End: 2025-06-23

## 2025-07-14 PROBLEM — G47.33 OSA ON CPAP: Status: ACTIVE | Noted: 2025-07-14

## 2025-07-14 PROBLEM — I50.9 CHRONIC CONGESTIVE HEART FAILURE (HCC): Status: ACTIVE | Noted: 2025-07-14

## 2025-07-14 NOTE — PROGRESS NOTES
[facility]  [address]  [facility] [address]  Dept: [phone]    Nicolette Rodriguez is a 65 y.o. female who presents today for her medical conditions/complaints as noted below.      Chief Complaint   Patient presents with    Hip Pain     Patient states she is still struggling with Right hip pain. She notes the meloxicam has not helped; She has done two rounds of it with no change.       HPI:     HPI  Hip pain:  hurts to sleep on it, and is stiff and takes a while to get going.   Hip has bothered her for 6 months.   Taking Mobic and it is not helping. Did ice.  Now taking Motrin.     COPD found on CT scan of lungs: SOB is ok and no cough  No components found for: \"LDLCHOLESTEROL\", \"LDLCALC\"    (goal LDL is <100)   AST (U/L)   Date Value   10/15/2021 21     ALT (U/L)   Date Value   10/15/2021 23     BUN (mg/dL)   Date Value   06/20/2025 26 (H)     BP Readings from Last 3 Encounters:   07/15/25 122/82   06/20/25 122/76   01/10/25 124/82          (goal 120/80)    Past Medical History:   Diagnosis Date    Fatigue     Hypertension       Past Surgical History:   Procedure Laterality Date    CARDIAC CATHETERIZATION  12/08/2021    CATARACT REMOVAL      EYE SURGERY Bilateral     eye muscles as a child    OVARY REMOVAL  2000    parital for cyst    TONSILLECTOMY         Family History   Problem Relation Age of Onset    Other Mother         preforation of bowel    Diabetes Mother     Cancer Father         kidney       Social History     Tobacco Use    Smoking status: Former     Current packs/day: 0.00     Average packs/day: 1 pack/day for 30.0 years (30.0 ttl pk-yrs)     Types: Cigarettes     Start date: 10/29/1984     Quit date: 10/29/2014     Years since quitting: 10.7    Smokeless tobacco: Never   Substance Use Topics    Alcohol use: Yes     Alcohol/week: 3.0 standard drinks of alcohol     Types: 3 Cans of beer per week     Comment: socially      Current Outpatient

## 2025-07-15 ENCOUNTER — HOSPITAL ENCOUNTER (OUTPATIENT)
Dept: GENERAL RADIOLOGY | Age: 65
Discharge: HOME | End: 2025-07-17
Payer: MEDICARE

## 2025-07-15 ENCOUNTER — OFFICE VISIT (OUTPATIENT)
Dept: PRIMARY CARE CLINIC | Age: 65
End: 2025-07-15
Payer: MEDICARE

## 2025-07-15 VITALS
HEIGHT: 70 IN | BODY MASS INDEX: 35.96 KG/M2 | WEIGHT: 251.2 LBS | DIASTOLIC BLOOD PRESSURE: 82 MMHG | OXYGEN SATURATION: 97 % | HEART RATE: 59 BPM | SYSTOLIC BLOOD PRESSURE: 122 MMHG

## 2025-07-15 DIAGNOSIS — G47.33 OSA ON CPAP: ICD-10-CM

## 2025-07-15 DIAGNOSIS — M25.551 RIGHT HIP PAIN: Primary | ICD-10-CM

## 2025-07-15 DIAGNOSIS — Z87.891 FORMER SMOKER: ICD-10-CM

## 2025-07-15 DIAGNOSIS — Z87.891 PERSONAL HISTORY OF TOBACCO USE: ICD-10-CM

## 2025-07-15 DIAGNOSIS — J44.9 CHRONIC OBSTRUCTIVE PULMONARY DISEASE, UNSPECIFIED COPD TYPE (HCC): ICD-10-CM

## 2025-07-15 DIAGNOSIS — M25.551 RIGHT HIP PAIN: ICD-10-CM

## 2025-07-15 DIAGNOSIS — I50.9 CHRONIC CONGESTIVE HEART FAILURE, UNSPECIFIED HEART FAILURE TYPE (HCC): ICD-10-CM

## 2025-07-15 PROCEDURE — 3074F SYST BP LT 130 MM HG: CPT | Performed by: PHYSICIAN ASSISTANT

## 2025-07-15 PROCEDURE — 1090F PRES/ABSN URINE INCON ASSESS: CPT | Performed by: PHYSICIAN ASSISTANT

## 2025-07-15 PROCEDURE — 73502 X-RAY EXAM HIP UNI 2-3 VIEWS: CPT

## 2025-07-15 PROCEDURE — G8417 CALC BMI ABV UP PARAM F/U: HCPCS | Performed by: PHYSICIAN ASSISTANT

## 2025-07-15 PROCEDURE — 1123F ACP DISCUSS/DSCN MKR DOCD: CPT | Performed by: PHYSICIAN ASSISTANT

## 2025-07-15 PROCEDURE — 3017F COLORECTAL CA SCREEN DOC REV: CPT | Performed by: PHYSICIAN ASSISTANT

## 2025-07-15 PROCEDURE — 3023F SPIROM DOC REV: CPT | Performed by: PHYSICIAN ASSISTANT

## 2025-07-15 PROCEDURE — G0296 VISIT TO DETERM LDCT ELIG: HCPCS | Performed by: PHYSICIAN ASSISTANT

## 2025-07-15 PROCEDURE — 99214 OFFICE O/P EST MOD 30 MIN: CPT | Performed by: PHYSICIAN ASSISTANT

## 2025-07-15 PROCEDURE — 3079F DIAST BP 80-89 MM HG: CPT | Performed by: PHYSICIAN ASSISTANT

## 2025-07-15 PROCEDURE — G8427 DOCREV CUR MEDS BY ELIG CLIN: HCPCS | Performed by: PHYSICIAN ASSISTANT

## 2025-07-15 PROCEDURE — G8400 PT W/DXA NO RESULTS DOC: HCPCS | Performed by: PHYSICIAN ASSISTANT

## 2025-07-15 PROCEDURE — 1036F TOBACCO NON-USER: CPT | Performed by: PHYSICIAN ASSISTANT

## 2025-07-15 RX ORDER — CELECOXIB 100 MG/1
100 CAPSULE ORAL 2 TIMES DAILY
Qty: 60 CAPSULE | Refills: 0 | Status: SHIPPED | OUTPATIENT
Start: 2025-07-15

## 2025-07-15 ASSESSMENT — PATIENT HEALTH QUESTIONNAIRE - PHQ9
SUM OF ALL RESPONSES TO PHQ QUESTIONS 1-9: 0
1. LITTLE INTEREST OR PLEASURE IN DOING THINGS: NOT AT ALL
2. FEELING DOWN, DEPRESSED OR HOPELESS: NOT AT ALL
SUM OF ALL RESPONSES TO PHQ QUESTIONS 1-9: 0

## 2025-07-21 ENCOUNTER — RESULTS FOLLOW-UP (OUTPATIENT)
Dept: PRIMARY CARE CLINIC | Age: 65
End: 2025-07-21

## 2025-07-24 ENCOUNTER — HOSPITAL ENCOUNTER (OUTPATIENT)
Dept: CT IMAGING | Age: 65
Discharge: HOME | End: 2025-07-26
Payer: MEDICARE

## 2025-07-24 VITALS — BODY MASS INDEX: 35.79 KG/M2 | HEIGHT: 70 IN | WEIGHT: 250 LBS

## 2025-07-24 DIAGNOSIS — Z87.891 PERSONAL HISTORY OF TOBACCO USE: ICD-10-CM

## 2025-07-24 PROCEDURE — 71271 CT THORAX LUNG CANCER SCR C-: CPT

## 2025-07-28 ENCOUNTER — OFFICE VISIT (OUTPATIENT)
Dept: PRIMARY CARE CLINIC | Age: 65
End: 2025-07-28
Payer: MEDICARE

## 2025-07-28 VITALS
WEIGHT: 247.6 LBS | OXYGEN SATURATION: 97 % | SYSTOLIC BLOOD PRESSURE: 110 MMHG | BODY MASS INDEX: 35.45 KG/M2 | HEIGHT: 70 IN | DIASTOLIC BLOOD PRESSURE: 80 MMHG | HEART RATE: 51 BPM

## 2025-07-28 DIAGNOSIS — E66.01 CLASS 2 SEVERE OBESITY WITH SERIOUS COMORBIDITY AND BODY MASS INDEX (BMI) OF 38.0 TO 38.9 IN ADULT, UNSPECIFIED OBESITY TYPE (HCC): ICD-10-CM

## 2025-07-28 DIAGNOSIS — Z11.59 NEED FOR HEPATITIS C SCREENING TEST: ICD-10-CM

## 2025-07-28 DIAGNOSIS — I10 HYPERTENSION, UNSPECIFIED TYPE: ICD-10-CM

## 2025-07-28 DIAGNOSIS — I50.9 CHRONIC CONGESTIVE HEART FAILURE, UNSPECIFIED HEART FAILURE TYPE (HCC): ICD-10-CM

## 2025-07-28 DIAGNOSIS — M79.89 LEG SWELLING: ICD-10-CM

## 2025-07-28 DIAGNOSIS — L65.9 HAIR LOSS: ICD-10-CM

## 2025-07-28 DIAGNOSIS — Z13.6 SCREENING FOR ISCHEMIC HEART DISEASE: ICD-10-CM

## 2025-07-28 DIAGNOSIS — R63.5 WEIGHT GAIN: ICD-10-CM

## 2025-07-28 DIAGNOSIS — M54.50 ACUTE RIGHT-SIDED LOW BACK PAIN WITHOUT SCIATICA: ICD-10-CM

## 2025-07-28 DIAGNOSIS — Z13.0 SCREENING FOR DEFICIENCY ANEMIA: ICD-10-CM

## 2025-07-28 DIAGNOSIS — Z11.4 SCREENING FOR HIV (HUMAN IMMUNODEFICIENCY VIRUS): ICD-10-CM

## 2025-07-28 DIAGNOSIS — Z12.31 ENCOUNTER FOR SCREENING MAMMOGRAM FOR BREAST CANCER: ICD-10-CM

## 2025-07-28 DIAGNOSIS — Z13.1 SCREENING FOR DIABETES MELLITUS: ICD-10-CM

## 2025-07-28 DIAGNOSIS — G47.33 OSA ON CPAP: ICD-10-CM

## 2025-07-28 DIAGNOSIS — Z87.891 PERSONAL HISTORY OF TOBACCO USE, PRESENTING HAZARDS TO HEALTH: ICD-10-CM

## 2025-07-28 DIAGNOSIS — E66.812 CLASS 2 SEVERE OBESITY WITH SERIOUS COMORBIDITY AND BODY MASS INDEX (BMI) OF 38.0 TO 38.9 IN ADULT, UNSPECIFIED OBESITY TYPE (HCC): ICD-10-CM

## 2025-07-28 DIAGNOSIS — Z78.0 MENOPAUSE: ICD-10-CM

## 2025-07-28 DIAGNOSIS — Z00.00 WELCOME TO MEDICARE PREVENTIVE VISIT: Primary | ICD-10-CM

## 2025-07-28 DIAGNOSIS — J44.9 CHRONIC OBSTRUCTIVE PULMONARY DISEASE, UNSPECIFIED COPD TYPE (HCC): ICD-10-CM

## 2025-07-28 PROCEDURE — 3079F DIAST BP 80-89 MM HG: CPT | Performed by: PHYSICIAN ASSISTANT

## 2025-07-28 PROCEDURE — 1123F ACP DISCUSS/DSCN MKR DOCD: CPT | Performed by: PHYSICIAN ASSISTANT

## 2025-07-28 PROCEDURE — 3017F COLORECTAL CA SCREEN DOC REV: CPT | Performed by: PHYSICIAN ASSISTANT

## 2025-07-28 PROCEDURE — G0402 INITIAL PREVENTIVE EXAM: HCPCS | Performed by: PHYSICIAN ASSISTANT

## 2025-07-28 PROCEDURE — 3074F SYST BP LT 130 MM HG: CPT | Performed by: PHYSICIAN ASSISTANT

## 2025-07-28 SDOH — HEALTH STABILITY: PHYSICAL HEALTH: ON AVERAGE, HOW MANY DAYS PER WEEK DO YOU ENGAGE IN MODERATE TO STRENUOUS EXERCISE (LIKE A BRISK WALK)?: 5 DAYS

## 2025-07-28 SDOH — HEALTH STABILITY: PHYSICAL HEALTH: ON AVERAGE, HOW MANY MINUTES DO YOU ENGAGE IN EXERCISE AT THIS LEVEL?: 40 MIN

## 2025-07-28 ASSESSMENT — LIFESTYLE VARIABLES
HOW OFTEN DO YOU HAVE A DRINK CONTAINING ALCOHOL: 3
HOW OFTEN DURING THE LAST YEAR HAVE YOU NEEDED AN ALCOHOLIC DRINK FIRST THING IN THE MORNING TO GET YOURSELF GOING AFTER A NIGHT OF HEAVY DRINKING: NEVER
HOW OFTEN DO YOU HAVE SIX OR MORE DRINKS ON ONE OCCASION: 2
HOW OFTEN DURING THE LAST YEAR HAVE YOU FAILED TO DO WHAT WAS NORMALLY EXPECTED FROM YOU BECAUSE OF DRINKING: NEVER
HOW OFTEN DURING THE LAST YEAR HAVE YOU FAILED TO DO WHAT WAS NORMALLY EXPECTED FROM YOU BECAUSE OF DRINKING: NEVER
HAVE YOU OR SOMEONE ELSE BEEN INJURED AS A RESULT OF YOUR DRINKING: NO
HOW MANY STANDARD DRINKS CONTAINING ALCOHOL DO YOU HAVE ON A TYPICAL DAY: 1
HOW OFTEN DURING THE LAST YEAR HAVE YOU FOUND THAT YOU WERE NOT ABLE TO STOP DRINKING ONCE YOU HAD STARTED: NEVER
HOW MANY STANDARD DRINKS CONTAINING ALCOHOL DO YOU HAVE ON A TYPICAL DAY: 1 OR 2
HAS A RELATIVE, FRIEND, DOCTOR, OR ANOTHER HEALTH PROFESSIONAL EXPRESSED CONCERN ABOUT YOUR DRINKING OR SUGGESTED YOU CUT DOWN: NO
HOW OFTEN DURING THE LAST YEAR HAVE YOU HAD A FEELING OF GUILT OR REMORSE AFTER DRINKING: NEVER
HOW OFTEN DO YOU HAVE A DRINK CONTAINING ALCOHOL: 2-4 TIMES A MONTH
HAVE YOU OR SOMEONE ELSE BEEN INJURED AS A RESULT OF YOUR DRINKING: NO
HOW OFTEN DURING THE LAST YEAR HAVE YOU HAD A FEELING OF GUILT OR REMORSE AFTER DRINKING: NEVER
HAS A RELATIVE, FRIEND, DOCTOR, OR ANOTHER HEALTH PROFESSIONAL EXPRESSED CONCERN ABOUT YOUR DRINKING OR SUGGESTED YOU CUT DOWN: NO
HOW OFTEN DURING THE LAST YEAR HAVE YOU BEEN UNABLE TO REMEMBER WHAT HAPPENED THE NIGHT BEFORE BECAUSE YOU HAD BEEN DRINKING: NEVER
HOW OFTEN DURING THE LAST YEAR HAVE YOU FOUND THAT YOU WERE NOT ABLE TO STOP DRINKING ONCE YOU HAD STARTED: NEVER
HOW OFTEN DURING THE LAST YEAR HAVE YOU BEEN UNABLE TO REMEMBER WHAT HAPPENED THE NIGHT BEFORE BECAUSE YOU HAD BEEN DRINKING: NEVER
HOW OFTEN DURING THE LAST YEAR HAVE YOU NEEDED AN ALCOHOLIC DRINK FIRST THING IN THE MORNING TO GET YOURSELF GOING AFTER A NIGHT OF HEAVY DRINKING: NEVER

## 2025-07-28 ASSESSMENT — PATIENT HEALTH QUESTIONNAIRE - PHQ9
SUM OF ALL RESPONSES TO PHQ QUESTIONS 1-9: 0
SUM OF ALL RESPONSES TO PHQ QUESTIONS 1-9: 0
1. LITTLE INTEREST OR PLEASURE IN DOING THINGS: NOT AT ALL
2. FEELING DOWN, DEPRESSED OR HOPELESS: NOT AT ALL
SUM OF ALL RESPONSES TO PHQ QUESTIONS 1-9: 0
SUM OF ALL RESPONSES TO PHQ QUESTIONS 1-9: 0

## 2025-07-28 NOTE — PATIENT INSTRUCTIONS
upper belly or in one or both shoulders or arms.     Lightheadedness or sudden weakness.     A fast or irregular heartbeat.   After you call 911, the  may tell you to chew 1 adult-strength or 2 to 4 low-dose aspirin. Wait for an ambulance. Do not try to drive yourself.  Watch closely for changes in your health, and be sure to contact your doctor if you have any problems.  Where can you learn more?  Go to https://www.[employer].net/patientEd and enter F075 to learn more about \"A Healthy Heart: Care Instructions.\"  Current as of: July 31, 2024  Content Version: 14.5  © 2454-9217 [employer].   Care instructions adapted under license by [employer]. If you have questions about a medical condition or this instruction, always ask your healthcare professional. [employer] disclaims any warranty or liability for your use of this information.    Personalized Preventive Plan for Nicolette Rodriguez - 7/28/2025  Medicare offers a range of preventive health benefits. Some of the tests and screenings are paid in full while other may be subject to a deductible, co-insurance, and/or copay.  Some of these benefits include a comprehensive review of your medical history including lifestyle, illnesses that may run in your family, and various assessments and screenings as appropriate.  After reviewing your medical record and screening and assessments performed today your provider may have ordered immunizations, labs, imaging, and/or referrals for you.  A list of these orders (if applicable) as well as your Preventive Care list are included within your After Visit Summary for your review.

## 2025-07-28 NOTE — PROGRESS NOTES
Medicare Annual Wellness Visit    Nicolette Cárdenasrobertoodin is here for Medicare AWV (Pt here today for Medicare Annual Wellness Visit. Pt given three words to remember: phone, happy, blue. Time given to draw was 9:35am./)    Assessment & Plan   Welcome to Medicare preventive visit  Hypertension, unspecified type  -     Lipid Panel; Future  -     Comprehensive Metabolic Panel, Fasting; Future  Chronic congestive heart failure, unspecified heart failure type (HCC)  -     Comprehensive Metabolic Panel, Fasting; Future  JUAN CARLOS on CPAP  Chronic obstructive pulmonary disease, unspecified COPD type (HCC)  Screening for diabetes mellitus  -     Hemoglobin A1C; Future  Screening for ischemic heart disease  -     Lipid Panel; Future  Personal history of tobacco use, presenting hazards to health  Menopause  -     DEXA BONE DENSITY 2 SITES; Future  Encounter for screening mammogram for breast cancer  -     MORAIMA MARIAJOSE DIGITAL SCREEN BILATERAL; Future  Class 2 severe obesity with serious comorbidity and body mass index (BMI) of 38.0 to 38.9 in adult, unspecified obesity type (HCC)  -     Hemoglobin A1C; Future  -     TSH reflex to FT4; Future  Screening for deficiency anemia  -     CBC with Auto Differential; Future  Screening for HIV (human immunodeficiency virus)  -     HIV Screen; Future  Need for hepatitis C screening test  -     Hepatitis C Antibody; Future  Leg swelling  Acute right-sided low back pain without sciatica  -     tiZANidine (ZANAFLEX) 4 MG tablet; Take 1 tablet by mouth every 8 hours as needed (muscle spasm), Disp-30 tablet, R-0Normal  Weight gain  -     TSH reflex to FT4; Future  Hair loss  -     TSH reflex to FT4; Future       Return for Will call with results.     Subjective   The following acute and/or chronic problems were also addressed today: Central low back pain and injured moving furniture yesterday. Celebrex not helping.  No leg numbness/tingling.  She can stop Celebrex and use Motrin along with her Tizanidine